# Patient Record
Sex: MALE | Race: WHITE | NOT HISPANIC OR LATINO | URBAN - METROPOLITAN AREA
[De-identification: names, ages, dates, MRNs, and addresses within clinical notes are randomized per-mention and may not be internally consistent; named-entity substitution may affect disease eponyms.]

---

## 2021-04-12 ENCOUNTER — NURSE TRIAGE (OUTPATIENT)
Dept: OTHER | Facility: OTHER | Age: 61
End: 2021-04-12

## 2021-04-12 DIAGNOSIS — Z20.828 SARS-ASSOCIATED CORONAVIRUS EXPOSURE: Primary | ICD-10-CM

## 2021-04-12 NOTE — TELEPHONE ENCOUNTER
1  Were you within 6 feet or less, for up to 15 minutes or more with a person that has a confirmed COVID-19 test? Yes was golfing Saturday  2  What was the date of your exposure? 2 Days ago  3  Are you experiencing any symptoms attributed to the virus?  (Assess for SOB, cough, fever, difficulty breathing) denies  4   HIGH RISK: Do you have any history heart or lung conditions, weakened immune system, diabetes, Asthma, CHF, HIV, COPD, Chemo, renal failure, sickle cell, etc? denies

## 2021-04-12 NOTE — TELEPHONE ENCOUNTER
Regarding: ASYMPTOMATIC - EXPOSURE - COVID TEST REQUEST  ----- Message from Yaneth Cagle sent at 4/12/2021  9:42 AM EDT -----  "I need to be tested due to my brother who tested positive for COVID and I was golfing with him on Saturday   No symptoms"

## 2021-04-23 ENCOUNTER — IMMUNIZATIONS (OUTPATIENT)
Dept: FAMILY MEDICINE CLINIC | Facility: HOSPITAL | Age: 61
End: 2021-04-23

## 2021-04-23 DIAGNOSIS — Z23 ENCOUNTER FOR IMMUNIZATION: Primary | ICD-10-CM

## 2021-04-23 PROCEDURE — 0011A SARS-COV-2 / COVID-19 MRNA VACCINE (MODERNA) 100 MCG: CPT

## 2021-04-23 PROCEDURE — 91301 SARS-COV-2 / COVID-19 MRNA VACCINE (MODERNA) 100 MCG: CPT

## 2021-05-21 ENCOUNTER — IMMUNIZATIONS (OUTPATIENT)
Dept: FAMILY MEDICINE CLINIC | Facility: HOSPITAL | Age: 61
End: 2021-05-21

## 2021-05-21 DIAGNOSIS — Z23 ENCOUNTER FOR IMMUNIZATION: Primary | ICD-10-CM

## 2021-05-21 PROCEDURE — 0012A SARS-COV-2 / COVID-19 MRNA VACCINE (MODERNA) 100 MCG: CPT

## 2021-05-21 PROCEDURE — 91301 SARS-COV-2 / COVID-19 MRNA VACCINE (MODERNA) 100 MCG: CPT

## 2022-10-05 ENCOUNTER — OFFICE VISIT (OUTPATIENT)
Dept: PSYCHIATRY | Facility: CLINIC | Age: 62
End: 2022-10-05
Payer: COMMERCIAL

## 2022-10-05 DIAGNOSIS — F41.1 GENERALIZED ANXIETY DISORDER: Primary | ICD-10-CM

## 2022-10-05 DIAGNOSIS — Z86.59 HISTORY OF BIPOLAR DISORDER: ICD-10-CM

## 2022-10-05 PROCEDURE — 99215 OFFICE O/P EST HI 40 MIN: CPT | Performed by: PSYCHIATRY & NEUROLOGY

## 2022-10-05 RX ORDER — LAMOTRIGINE 150 MG/1
150 TABLET ORAL DAILY
COMMUNITY

## 2022-10-05 RX ORDER — CLONAZEPAM 0.5 MG/1
0.5 TABLET ORAL 2 TIMES DAILY
COMMUNITY
End: 2022-10-05 | Stop reason: SDUPTHER

## 2022-10-05 RX ORDER — CLONAZEPAM 0.5 MG/1
TABLET ORAL
Qty: 75 TABLET | Refills: 1 | Status: SHIPPED | OUTPATIENT
Start: 2022-10-05

## 2022-10-05 NOTE — PATIENT INSTRUCTIONS
Thanks for presenting to today's appointment  Celestine based on what your reported there's findings of generalized anxiety  Has discussed recommend starting something for treatment   Think this through and plans to talk about on next psychiatric appointment at [place] [address]

## 2022-10-05 NOTE — PSYCH
[facility]/Hospital: [address]   [address] [facility]    Psychiatric Progress Note  MRN#: [id number]  Babitaflor Diamond 58 y o  male    This note was not shared with the patient due to reasonable likelihood of causing patient harm   This Patient was seen in the office today at [address]  Transfer case of Lopez Nail - due insurance requiring face to face appointment and not phone    Subjective:  Lencho Robison reported having anxiety due to work stressors  Works as  at job x 23 yrs  For last 2-3 yrs with anxiety- was once per wk  Now stressed, he's In the mist of looking for other jobs, he's put in applications  Also with limited funds due to [employer] his son his money  State otherwise, his family only talk to him if they want something  Now anxiety every other day x 3 wks  pace/restlessness  Sleep is fragment , waking up a couple of times than to go sleep  Never slept full 8 hrs  Also recently with  jittery, fidgets, tremors increased heart beat, irritable from day to day  Sometimes anxiety happens randomly  Also have thoughts of " nobody likes [him] or wants [him] but without SI  Thinks most of it is the stress at his job, but also financial worries  Typically more layed back, but recently not now  Now Clonzapem 0 5mg once in morning , day and at night   But not working like it did in past  Was diagnosed with Bipolar Disorder when he first was patient  Has moments of high energy, scored high on bipolar rating form  Then was prescribed lamotrigine for mood  Described as wonder drug       Medication List  Lamotrigine 150mg   Clonazepam 0 5mg 2 tablet + half a tablet     Psychiatric Hx:  Hospitalization: no  SIB/SA- no  Med Trail: no    Social Hx:  Living Situation: [place], [place]  Children: no, although has step son ( 27 yrs old  Martial Status:  x 22 yrs  Occupational:       Substance Hx:  ETOH- no, twice per yr, special occassions   Tobacco: half - 3/4 pack per day ( average  ~ 1/2 per day - 2 yrs)   Onset of smoking x 2 yrs  Illicit: sober x 80YJ- 30yrs ( smoked marijuana, crystal meth -snort, cocaine)  Quit cold turkey, due to lossing a lot around him and friends  Wiith cravings    Medical Hx: none    Allergies:   Codine- nausea  Flexeril- nausea        FHX:  Without mential health illness history   Mother - Type II diabetes ( insulin dependent) -  in  due to complication          Mental Status Evaluation:  General Appearance:  Makenzie Thompson is a 58 y o   male casually dressed, adequate hygiene and grooming, looks stated age   Behavior:  pleasant, cooperative, calm, adequate eye contact   Speech:  normal for rate, rhythm, volume, latency, amount   Mood:  anxious   Affect:  constricted and increased in range   Thought Process:  circumstantial, logical and tangential   Thought Content:  no overt delusions, normal, ruminations   Perceptual Disturbances: no auditory hallucinations, no visual hallucinations, does not appear responding to internal stimuli  Delusions  No   Risk Potential: Suicidal Ideations No  Homicidal Ideations No  Potential for Aggression No     Sensorium:  Oriented to person, place, time/date and situation   Memory:  recent and remote memory grossly intact   Consciousness:  alert and awake   Attention: attention span and concentration are age appropriate   Insight:  fair   Judgment: fair   Gait/Station: normal   Motor Activity: no abnormal movements     There were no vitals filed for this visit  Medications:   No current outpatient medications on file prior to visit  No current facility-administered medications on file prior to visit  Labs: I have personally reviewed all pertinent laboratory/tests results   Most Recent Labs: No results found ________________________________________________________________________    Lauren Mariano is 58 yr old  male, history bipolar disorder and anxiety  Presented with generalized anxiety associated with work stress , onset about 2 yrs  Symptoms of sleep disturbance , restlessness, irritability  Cased complicated by intermittent somatic symptoms not meeting criteria for panic attacks  Presented on Lamotrigine and Clonazepam  Completed TARI 7;discrepancies with narrative reporting's ,with MSE findings suggestive of higher severity     TARI 7= 5 - likely under reporting     DSM5  Generalized anxiety disorder  History of bipolar disorder      PLAN:   Discussed diagnotic impression and clinical finding  Recommended antianxiety medications, such as SSRI  Pt was resistant  Continued medications as prescribed   Lamotrigine 150mg  Clonazepam 0 5mg       Risks, benefits, and possible side effects of medications explained to patient and patient verbalizes understanding  Next Appointment:  4 wks      Encounter Duration: Time Spent 45 minutes with Patient  Greater than 50% of total time was spent with the patient             I interviewed and examined the patient  I discussed the clinical finding, diagnoses and treatment plan with the patient  I  wrote and reviewed the note , prescribed medications, and orders placed  I was present in the clinic and examined the patient      Mikie Macdonald DO ,10/05/22

## 2022-11-08 DIAGNOSIS — F31.60 BIPOLAR I DISORDER, MOST RECENT EPISODE MIXED (HCC): Primary | ICD-10-CM

## 2022-11-09 ENCOUNTER — OFFICE VISIT (OUTPATIENT)
Dept: PSYCHIATRY | Facility: CLINIC | Age: 62
End: 2022-11-09

## 2022-11-09 DIAGNOSIS — F31.60 BIPOLAR I DISORDER, MOST RECENT EPISODE MIXED (HCC): ICD-10-CM

## 2022-11-09 DIAGNOSIS — F41.1 GENERALIZED ANXIETY DISORDER: ICD-10-CM

## 2022-11-09 RX ORDER — CLONAZEPAM 0.5 MG/1
TABLET ORAL
Qty: 75 TABLET | Refills: 1 | Status: SHIPPED | OUTPATIENT
Start: 2022-11-09

## 2022-11-09 RX ORDER — LAMOTRIGINE 150 MG/1
TABLET ORAL
Qty: 90 TABLET | Refills: 1 | Status: SHIPPED | OUTPATIENT
Start: 2022-11-09 | End: 2022-11-09 | Stop reason: SDUPTHER

## 2022-11-09 RX ORDER — LAMOTRIGINE 150 MG/1
150 TABLET ORAL DAILY
Qty: 90 TABLET | Refills: 0 | Status: SHIPPED | OUTPATIENT
Start: 2022-11-09

## 2022-11-09 NOTE — PATIENT INSTRUCTIONS
Becky [place] [id number]   [facility]   Your medications were not changed

## 2022-11-09 NOTE — PSYCH
[facility]/Hospital: [address]   [address] [facility]    Psychiatric Progress Note  MRN#: [id number]  Kade Saucedo 58 y o  male    This note was not shared with the patient due to reasonable likelihood of causing patient harm   This Patient was seen in the office today at [facility] location  Subjective:  Celestine reported less anxiety , less panic attacks   Stated hes job is high stress ( [employer]) was able to talk to his boss and not  In positive mood  Denies SI , HI  Medication compliance: Lamotrigine 150mg - effective, also Clonazepam 0 5mg - sleep improvement , sometime hard to shut down thoughts then have hard time falling to sleep , but when he's a sleep , he's sleep  On Clonazepam every day  Celestine , Denies smoking marijuana, clean x 16 yrs         Mental Status Evaluation:  General Appearance:  Kade Saucedo is a 58 y o   male casually dressed, adequate hygiene and grooming, looks stated age   Behavior:  pleasant, cooperative, adequate eye contact   Speech:  normal for rate, rhythm, volume, latency, amount   Mood:  Less anxious    Affect:  Normal    Thought Process:  circumstantial, logical and tangential   Thought Content:  no overt delusions, normal, ruminations   Perceptual Disturbances:  does not appear responding or preoccupied   Delusions  No   Risk Potential: Suicidal Ideations No  Homicidal Ideations No  Potential for Aggression No     Sensorium:  Oriented to person, place, time/date and situation   Memory:  recent and remote memory grossly intact   Consciousness:  alert and awake   Attention: attention span and concentration are age appropriate   Insight:  fair   Judgment: fair   Gait/Station: normal   Motor Activity: no abnormal movements     There were no vitals filed for this visit       Medications:   Current Outpatient Medications on File Prior to Visit Medication Sig Dispense Refill   • clonazePAM (KlonoPIN) 0 5 mg tablet Clonazepam 0 5m tablet twice per day + half a tablet per day 75 tablet 1   • lamoTRIgine (LaMICtal) 150 MG tablet Take 150 mg by mouth daily       No current facility-administered medications on file prior to visit  Labs: I have personally reviewed all pertinent laboratory/tests results  Most Recent Labs: No results found   ________________________________________________________________________    A/P  Sharran Peabody is 58 yr old  male, history bipolar disorder and anxiety  Presented with generalized anxiety associated with work stress , onset about 2 yrs  Symptoms of sleep disturbance , restlessness, irritability  Cased complicated by intermittent somatic symptoms not meeting criteria for panic attacks  Presented on Lamotrigine and Clonazepam  Today , with stable mood , slight anxiety, Celestine reported as tolerable         DSM5  Generalized anxiety disorder  History of bipolar disorder      PLAN:   Discussed diagnotic impression and clinical finding   Continued medications as prescribed   Lamotrigine 150mg  Clonazepam 0 5mg  BID + PRN      Risks, benefits, and possible side effects of medications explained to patient and patient verbalizes understanding  Next Appointment:  3 months   Today's Appointment@ [facility]  Face To Face:  5:17 PM -5:31 PM;Documentation Time:  5:03 PM- 5:06 PM (11/10/22)  Encounter Duration: Time Spent 14 minutes with Patient  Greater than 50% of total time was spent with the patient

## 2023-01-25 ENCOUNTER — OFFICE VISIT (OUTPATIENT)
Dept: PSYCHIATRY | Facility: CLINIC | Age: 63
End: 2023-01-25

## 2023-01-25 DIAGNOSIS — F31.60 BIPOLAR I DISORDER, MOST RECENT EPISODE MIXED (HCC): ICD-10-CM

## 2023-01-25 DIAGNOSIS — F41.1 GENERALIZED ANXIETY DISORDER: ICD-10-CM

## 2023-01-25 RX ORDER — LAMOTRIGINE 150 MG/1
TABLET ORAL
Qty: 90 TABLET | Refills: 0 | Status: SHIPPED | OUTPATIENT
Start: 2023-01-25

## 2023-01-25 RX ORDER — CLONAZEPAM 0.5 MG/1
TABLET ORAL
Qty: 75 TABLET | Refills: 2 | Status: SHIPPED | OUTPATIENT
Start: 2023-01-25

## 2023-01-25 NOTE — PSYCH
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  Otherwise, mood is okay, less  Stress, he's time to occupy his time, with exercising, doing hands on tasks   Stable sleep, appetite stable  Anxiety-no, Si/HI-no    Medications: Celestine reported compliance to Lamotrigine 150mg in morning and Clonazepam- 1 in morning , 1/2 tablet in afternoon , and 1 at night   Medication side effect; none    ROS: none      Mental Status Evaluation:  General Appearance:  Mary Ellen Voss is a 58 y o   male casually dressed, adequate hygiene and grooming, looks stated age   Behavior:  pleasant, cooperative, adequate eye contact   Speech:  normal for rate, rhythm, volume, latency, amount   Mood:  Less anxious    Affect:  Normal    Thought Process:  circumstantial, logical and tangential   Thought Content:  no overt delusions, normal, ruminations   Perceptual Disturbances:  does not appear responding or preoccupied   Delusions  No   Risk Potential: Suicidal Ideations No  Homicidal Ideations No  Potential for Aggression No     Sensorium:  Oriented to person, place, time/date and situation   Memory:  recent and remote memory grossly intact   Consciousness:  alert and awake   Attention: attention span and concentration are age appropriate   Insight:  fair   Judgment: fair   Gait/Station: normal   Motor Activity: no abnormal movements     There were no vitals filed for this visit       Medications:   Current Outpatient Medications on File Prior to Visit   Medication Sig Dispense Refill   • clonazePAM (KlonoPIN) 0 5 mg tablet Clonazepam 0 5m tablet twice per day + half a tablet per day 75 tablet 1   • lamoTRIgine (LaMICtal) 150 MG tablet Take 1 tablet (150 mg total) by mouth daily 90 tablet 0     No current facility-administered medications on file prior to visit  Labs: I have personally reviewed all pertinent laboratory/tests results  No visits with results within 6 Month(s) from this visit  Latest known visit with results is:   No results found for any previous visit      ________________________________________________________________________    A/P  Griselda Harrell 58 y o ,  male, history bipolar disorder and anxiety  Presented with generalized anxiety associated with work stress , onset about 2 yrs  Cased complicated w/ associated somatic symptoms, and MSE finding of talkativeness, high energy and pitch  Today , presented with similar finding although stable mood despite acute stressors  DSM5  Generalized anxiety disorder  History of bipolar disorder      PLAN:   Discussed diagnotic impression  Based on history , external records reviewed and clinical finding   PDMP w/o discrepanies- 23  Lamotrigine 150mg  Clonazepam 0 5mg  BID +1/2 table     Medication Prescribed During This Encounter at [facility]:  •  clonazePAM (KlonoPIN) 0 5 mg tablet, Clonazepam 0 5m tablet twice daily  (morning ,night) + 1/2 tablet( in afternoon), Disp: 75 tablet, Rfl: 2  •  lamoTRIgine (LaMICtal) 150 MG tablet, Lamotrigine 150mg : 1 tablet po daily, Disp: 90 tablet, Rfl: 0      Treatement: Risks, benefits, and possible side effects of medications explained to patient and patient verbalizes understanding  Next Appointment:  3 months                       Today's Appointment@ [facility]                    Face To Face:  4:51 PM -5:06 PM;Documentation Time:  7:29 PM- 7:37 PM                     Encounter Duration: Time Spent 15 minutes with Patient  Greater than 50% of total time was spent with the patient     MDM  Number of Diagnoses or Management Options  Bipolar I disorder, most recent episode mixed ([facility] [address] ): established, improving  Generalized anxiety disorder: established, improving     Amount and/or Complexity of Data Reviewed  Review and summarize past medical records: yes    Risk of Complications, Morbidity, and/or Mortality  Presenting problems: low  Management options: low

## 2023-01-25 NOTE — PATIENT INSTRUCTIONS
Devaughn Jensen [id number]   Thanks for presenting to today's Appointment at [address]   Your medications were not changed

## 2023-04-19 DIAGNOSIS — F41.1 GENERALIZED ANXIETY DISORDER: ICD-10-CM

## 2023-04-19 DIAGNOSIS — F31.60 BIPOLAR I DISORDER, MOST RECENT EPISODE MIXED (HCC): ICD-10-CM

## 2023-04-19 RX ORDER — CLONAZEPAM 0.5 MG/1
TABLET ORAL
Qty: 75 TABLET | Refills: 2 | Status: SHIPPED | OUTPATIENT
Start: 2023-04-19 | End: 2023-08-02 | Stop reason: SDUPTHER

## 2023-04-19 RX ORDER — LAMOTRIGINE 150 MG/1
TABLET ORAL
Qty: 90 TABLET | Refills: 0 | Status: SHIPPED | OUTPATIENT
Start: 2023-04-19 | End: 2023-07-31 | Stop reason: SDUPTHER

## 2023-07-31 DIAGNOSIS — F31.60 BIPOLAR I DISORDER, MOST RECENT EPISODE MIXED (HCC): ICD-10-CM

## 2023-07-31 RX ORDER — LAMOTRIGINE 150 MG/1
TABLET ORAL
Qty: 5 TABLET | Refills: 1 | Status: SHIPPED | OUTPATIENT
Start: 2023-07-31 | End: 2023-08-02 | Stop reason: SDUPTHER

## 2023-07-31 NOTE — TELEPHONE ENCOUNTER
Pt Radha Rodriguez, 1960, was last seen at [facility] on 01/2023.  Sent short supply of Lamictal to pharmacy on file ([facility])     This note was not shared with the patient due to reasonable likelihood of causing patient harm

## 2023-07-31 NOTE — TELEPHONE ENCOUNTER
Pt requested refill of the lamotrigine 150    Said that he ran out this past Saturday and asked that he can have enough until his appt this Thursday    Was a Paul Job pt

## 2023-08-02 ENCOUNTER — OFFICE VISIT (OUTPATIENT)
Dept: PSYCHIATRY | Facility: CLINIC | Age: 63
End: 2023-08-02
Payer: COMMERCIAL

## 2023-08-02 DIAGNOSIS — F31.60 BIPOLAR I DISORDER, MOST RECENT EPISODE MIXED (HCC): ICD-10-CM

## 2023-08-02 DIAGNOSIS — F41.1 GENERALIZED ANXIETY DISORDER: ICD-10-CM

## 2023-08-02 PROCEDURE — 99214 OFFICE O/P EST MOD 30 MIN: CPT | Performed by: PSYCHIATRY & NEUROLOGY

## 2023-08-02 RX ORDER — METOPROLOL SUCCINATE 25 MG/1
25 TABLET, EXTENDED RELEASE ORAL EVERY 24 HOURS
COMMUNITY
Start: 2023-05-15

## 2023-08-02 RX ORDER — ATORVASTATIN CALCIUM 20 MG/1
20 TABLET, FILM COATED ORAL DAILY
COMMUNITY
Start: 2023-07-18

## 2023-08-02 RX ORDER — CLONAZEPAM 0.5 MG/1
TABLET ORAL
Qty: 75 TABLET | Refills: 2 | Status: SHIPPED | OUTPATIENT
Start: 2023-08-09 | End: 2023-08-30

## 2023-08-02 RX ORDER — LAMOTRIGINE 150 MG/1
TABLET ORAL
Qty: 90 TABLET | Refills: 0 | Status: SHIPPED | OUTPATIENT
Start: 2023-08-02

## 2023-08-02 NOTE — PATIENT INSTRUCTIONS
Emma Mcrae [id number]   Thanks for presenting to today's Appointment at [facility]  Your medications were not changed

## 2023-08-02 NOTE — PSYCH
[facility]/Hospital: [address] [facility]  [place], [address]    Psychiatric Progress Note  MRN#: [id number]  Radha Rodriguez 58 y.o. male    This note was not shared with the patient due to reasonable likelihood of causing patient harm   _________________________________________________________________________________________________________________________________  OFFICE APPOINTMENT   Patient was seen today at [address] [facility] location                                                Patient Radha Rodriguez ,1960   Prescriber/Physician: Ping Walker DO Physician Location:   [address] [facility] [address]     • This service was provided in the office. Patient is currently located in the [facility], where I am  licensed. • Patient gave consent to proceed with encounter; acknowledge understanding of security and privacy of encounter   • Patient verbalized understanding evaluation only involves Psychiatric diagnosing, prescribing, result monitoring   • Patient was informed this is a billable service  ___________________________________________________________________________________________________________________________________       Subjective:  [facility]. He took the day off from work , thought appointment was today, although was scheduled for tomorrow; he was happy he was able to get seen today. Otherwise, Abbie Lee dad  from kidney dz on  2023. There was some depression, although was able to grieve, he's now stable. Reported his social supports as his wife Paramjit Whelan) and his Rastafari. He complained of anxiety , he  recent had chest pain , had cardiac workup, and was diagnosed with aneurysm- currently stable. External Records Reviewed;  He presented to ER May 03,2023 for chest pain ,WNL labs and imagining. Also,  history of CT scan x 3 months ago - finding of thoracic  aortic aneurysm    ROS:  Si/Hi- denies  Sleep- stable; duration 6-8 hrs  Zeny- last occurred in 1yr ago, did not last long   Appetite- stable  Anxiety- stable    Medications: Celestine reported compliance to Lamotrigine 150mg in morning and Clonazepam- 1 in morning , 1/2 tablet in afternoon , and 1 at night . Medication side effect; denies    Medical ROS:    W/o  Chest pain, SOB, dizziness, radating pain   w/o headache  Defer to HPI for pertinant positive, otherwise negative     . Mental Status Evaluation:  General Appearance:  Hi Krause is a 58 y.o.  male casually dressed, adequate hygiene and grooming, looks stated age   Behavior:  pleasant, cooperative, adequate eye contact   Speech:  Normal to pressure for rate, rhythm, volume, latency, amount   Mood:  normla   Affect:  Normal    Thought Process:  circumstantial, logical and tangential   Thought Content:  no overt delusions, normal, ruminations   Perceptual Disturbances:  does not appear responding or preoccupied   Delusions  No   Risk Potential: Suicidal Ideations No  Homicidal Ideations No  Potential for Aggression No     Sensorium:  Oriented to person, place, time/date and situation   Memory:  recent and remote memory grossly intact   Consciousness:  alert and awake   Attention: attention span and concentration are age appropriate   Insight:  fair   Judgment: fair   Gait/Station: normal   Motor Activity: no abnormal movements     There were no vitals filed for this visit.      Medications:   Current Outpatient Medications on File Prior to Visit   Medication Sig Dispense Refill   • metoprolol succinate (TOPROL-XL) 25 mg 24 hr tablet Take 25 mg by mouth every 24 hours     • atorvastatin (LIPITOR) 20 mg tablet Take 20 mg by mouth daily     • [DISCONTINUED] clonazePAM (KlonoPIN) 0.5 mg tablet Clonazepam 0.5m tablet twice daily  (morning ,night) + 1/2 tablet( in afternoon) 75 tablet 2   • [DISCONTINUED] lamoTRIgine (LaMICtal) 150 MG tablet Lamotrigine 150mg : 1 tablet po daily 5 tablet 1     No current facility-administered medications on file prior to visit. Labs: I have personally reviewed all pertinent laboratory/tests results. No visits with results within 6 Month(s) from this visit. Latest known visit with results is:   No results found for any previous visit. External Record Reviewed:   cardio workup x 2 ( May 03, 2023, )   negative tropinonins x 2 , WNL CBC diff, CMP,   23: EKG , Normal sinus rhythm ,;       ________________________________________________________________________    A/P  Davonte Jones, is a  58 y.o.,  male, history bipolar disorder and anxiety, presented with generalized anxiety cause of work stressors. Interim events of noah symptoms and somatic complaints. Case complicated as he was recent diagnosed with thoracic aneurysm , currently stable       DSM5  Generalized anxiety disorder  History of bipolar disorder      PLAN:   History and external records were  Reviewed. Discussed clinical findings and diagnotic impression  Did not change patients medications   PDMP w/o discrepanies  Lamotrigine 150mg  Clonazepam 0.5mg  BID +1/2 table     Medication Prescribed During This Encounter at [facility]:  •  clonazePAM (KlonoPIN) 0.5 mg tablet, Clonazepam 0.5m tablet twice daily  (morning ,night) + 1/2 tablet( in afternoon), Disp: 75 tablet, Rfl: 2  •  lamoTRIgine (LaMICtal) 150 MG tablet, Lamotrigine 150mg : 1 tablet po daily, Disp: 90 tablet, Rfl: 0      Treatement: Risks, benefits, and possible side effects of medications explained to patient and patient verbalizes understanding.                       Next Appointment:  3 months                       Today's Appointment@ [facility]                    Face To Face:  3:09PM- 3: 29PM  ;Documentation Time:  7:00PM- 7:37PM                    Encounter Duration: Time Spent 20 minutes with Patient. Greater than 50% of total time was spent with the patient     MDM  Number of Diagnoses or Management Options  Bipolar I disorder, most recent episode mixed ([address]): established, improving  Generalized anxiety disorder: established, worsening     Amount and/or Complexity of Data Reviewed  Clinical lab tests: reviewed  Review and summarize past medical records: yes  Independent visualization of images, tracings, or specimens: yes    Risk of Complications, Morbidity, and/or Mortality  Presenting problems: low  Diagnostic procedures: moderate  Management options: moderate

## 2023-10-26 NOTE — PSYCH
[facility]: [address] [place] [address] Outpatient Clinic/Non Addiction   [facility], [address] [facility]    Psychiatric Progress Note  MRN#: [id number]  Nadine Gonzalez 61 y.o. male    This note was not shared with the patient due to reasonable likelihood of causing patient harm   _________________________________________________________________________________________________________________________________  OFFICE APPOINTMENT   Patient was seen today at [facility] location                                                Patient Nadine Gonzalez ,1960   Prescriber/Physician: Jaciel Tobar DO Physician Location:   [address] [place] [id number]     This service was provided in the office. Patient is currently located in the [place], where I am  licensed. Patient gave consent to proceed with encounter; acknowledge understanding of security and privacy of encounter   Patient verbalized understanding evaluation only involves Psychiatric diagnosing, prescribing, result monitoring   Patient was informed this is a billable service  ___________________________________________________________________________________________________________________________________     Chief Complaint   Patient presents with   • Anxiety        Subjective:. Nadine Gonzalez  stated  he was layed off his job- denied having associated distress. Anxiety was reported as stable, he last  felt anxious during August 2023 . Also denied bipolar symptoms, without high high or low low as he thinks Lamotrigine are working.  Stated Clonazepam- calms him, less shaking, less anxious  Otherwise has pending cardiology appointment for aneurysm    ROS:        Substance Abuse History- he's sober x 25 yrs   Si/Hi- denies  Sleep- well, duration 6-8 hrs   Depression- denies  Zeny- defer to above  Appetite- normal    Medications: Celestine reported compliance to Lamotrigine 150mg in morning and Clonazepam- 1 in morning , 1/2 tablet in afternoon , and 1 at night . Medication side effect; denies    Medical ROS:    Constitutional : negative fevers , chills  Cardiac- Negative chest pain , chest palpitation  Respiratory: Negative SOB, wheezing  Neuro: Negative falls , cognitive impairment  Defer to HPI for pertinant positive, otherwise negative     . Mental Status Evaluation:  General Appearance:  Walter Gregg is a 61 y.o.  male casually dressed,  looks stated age   Behavior:  pleasant,  adequate eye contact   Speech:  Normal to pressure for rate, rhythm, volume, latency, amount   Mood:  normal   Affect:  Normal    Thought Process:  normal and logical   Thought Content:  no overt delusions, normal   Perceptual Disturbances:  does not appear responding or preoccupied   Delusions  w/o   Risk Potential: Suicidal Ideations w/o  Homicidal Ideations w/o  Potential for Aggression  w/o   Sensorium:  Oriented to person, place [facility]), time/date ( 2023) and situation   Memory:  recent and remote memory grossly intact   Consciousness:  alert and awake   Attention: attention span and concentration are age appropriate   Insight:  appropriate   Judgment: appropriate   Gait/Station: normal   Motor Activity: no abnormal movements     There were no vitals filed for this visit.      Medications:   Current Outpatient Medications on File Prior to Visit   Medication Sig Dispense Refill   • atorvastatin (LIPITOR) 20 mg tablet Take 20 mg by mouth daily     • clonazePAM (KlonoPIN) 0.5 mg tablet Clonazepam 0.5m tablet twice daily  (morning ,night) + 1/2 tablet( in afternoon) Do not start before 2023. 75 tablet 2   • lamoTRIgine (LaMICtal) 150 MG tablet Lamotrigine 150mg : 1 tablet po daily 90 tablet 0   • metoprolol succinate (TOPROL-XL) 25 mg 24 hr tablet Take 25 mg by mouth every 24 hours       No current facility-administered medications on file prior to visit. Labs: I have personally reviewed all pertinent laboratory/tests results. No visits with results within 6 Month(s) from this visit. Latest known visit with results is:   No results found for any previous visit. External Record Reviewed:   cardio workup x 2 ( May 03, 2023, )   negative tropinonins x 2 , WNL CBC diff, CMP,   23: EKG , Normal sinus rhythm ,;       ________________________________________________________________________    A/P  Walter Gregg, is a  61 y.o.,  male, history bipolar disorder and anxiety, presented with generalized anxiety work related. Interim events pressured dialogue with anxiety. Case complicated , as there's  recent diagnoses of thoracic aneurysm pending surgery. Currently stable       DSM5  Generalized anxiety disorder  Bipolar disorder in full remission, most recent episode unspecified type            PLAN:   History and external records were reviewed. Discussed clinical findings and diagnotic impression; stable   Did not change patients medications   PDMP w/o discrepancies- Clonazepam 0.5mg refilled 23, has 1 refill       Medication Prescribed During This Encounter at [facility]:    -     clonazePAM (KlonoPIN) 0.5 mg tablet; Clonazepam 0.5m tablet twice daily  (morning ,night) + 1/2 tablet( in afternoon), qty 75 ;2 refills  -     lamoTRIgine (LaMICtal) 150 MG tablet; Lamotrigine 150mg : 1 tablet po daily, qty 90           Treatement: Risks, benefits, and possible side effects of medications explained to patient and patient verbalizes understanding. Next Appointment:  3 months                       Today's Appointment@ [facility]                    Face To Face:  5:39PM- 6:12PM                     Encounter Duration: Time Spent  33 minutes with Patient. Greater than 50% of total time was spent with the patient     MDM  Number of Diagnoses or Management Options  Diagnosis management comments: 2       Amount and/or Complexity of Data Reviewed  Review and summarize past medical records: yes    Risk of Complications, Morbidity, and/or Mortality  Presenting problems: low  Diagnostic procedures: moderate  Management options: moderate

## 2023-10-26 NOTE — PATIENT INSTRUCTIONS
Jorge Martinez [id number]   Thanks for presenting to today's Appointment at [address]  Your medications were not changed

## 2023-10-30 ENCOUNTER — OFFICE VISIT (OUTPATIENT)
Dept: PSYCHIATRY | Facility: CLINIC | Age: 63
End: 2023-10-30
Payer: COMMERCIAL

## 2023-10-30 DIAGNOSIS — F41.1 GENERALIZED ANXIETY DISORDER: Primary | ICD-10-CM

## 2023-10-30 DIAGNOSIS — F31.70 BIPOLAR DISORDER IN FULL REMISSION, MOST RECENT EPISODE UNSPECIFIED TYPE (HCC): ICD-10-CM

## 2023-10-30 PROCEDURE — 99213 OFFICE O/P EST LOW 20 MIN: CPT | Performed by: PSYCHIATRY & NEUROLOGY

## 2023-10-30 RX ORDER — CLONAZEPAM 0.5 MG/1
TABLET ORAL
Qty: 75 TABLET | Refills: 2 | Status: SHIPPED | OUTPATIENT
Start: 2023-10-30 | End: 2023-11-20

## 2023-10-30 RX ORDER — LAMOTRIGINE 150 MG/1
TABLET ORAL
Qty: 90 TABLET | Refills: 0 | Status: SHIPPED | OUTPATIENT
Start: 2023-10-30

## 2024-02-02 DIAGNOSIS — F31.70 BIPOLAR DISORDER IN FULL REMISSION, MOST RECENT EPISODE UNSPECIFIED TYPE (HCC): ICD-10-CM

## 2024-02-02 RX ORDER — LAMOTRIGINE 150 MG/1
TABLET ORAL
Qty: 90 TABLET | Refills: 0 | Status: SHIPPED | OUTPATIENT
Start: 2024-02-02 | End: 2024-02-09 | Stop reason: SDUPTHER

## 2024-02-02 NOTE — TELEPHONE ENCOUNTER
Pt Celestine Godfrey 1960, SLPF chart was reviewed. Lamotrigien 150mg was sent to [facility] pharmacy    This note was not shared with the patient due to reasonable likelihood of causing patient harm

## 2024-02-06 NOTE — PATIENT INSTRUCTIONS
Celestine Godfrey [id number]   Thanks for presenting to today's Appointment at [facility]  Lamotrigine was increased:  150mg tablet  in the morning ; 2 tablets of 25mg in  the afternoon  Your other  medications were not changed

## 2024-02-06 NOTE — PSYCH
[facility]/Hospital: [facility] Outpatient Clinic/Non Addiction   [address] [id number] [phone]    Psychiatric Progress Note  MRN#: [id number]  Celestine Godfrey 63 y.o. male    This note was not shared with the patient due to reasonable likelihood of causing patient harm   _________________________________________________________________________________________________________________________________  OFFICE APPOINTMENT   Patient was seen today at [facility] location                                                Patient Celestine Godfrey , male ,1960   Prescriber/Physician: Rafiq Mortensen DO, she/her Physician Location:   [facility] OUTPATIENT  [address] [facility] [address]     This service was provided in the office.  Patient is currently located in the [place], where I am  licensed.   Patient gave consent to proceed with encounter; acknowledge understanding of security and privacy of encounter   Patient verbalized understanding evaluation only involves Psychiatric diagnosing, prescribing, result monitoring   Patient was informed this is a billable service  ___________________________________________________________________________________________________________________________________     Reason For Visit   Chief Complaint   Patient presents with    Anxiety        Subjective:.    Celestine Godfrey complained of anxiety with associated  irritabile, shaking , and pacing cause he gets waned up about worries. Stated he tends to talk more also when anxious.  He described several stressors such as pending surgery , unemployment  and worries about finances . He has does landscaping jobs in warmer weather.  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  Medication side effect; denies    Medical ROS:    Respiratory: negative for Shortness of breath , hyperventilation  Cardiovascular: negative for chest pain, palpitations, and increase heart rate   Musculoskeletal:positive for Left carpal tunnel pain, he has pending surgery   Neurological: negative for denies recent falls    Pertinent items are noted in HPI, all other symptoms are negative       .    Mental Status Evaluation:  General Appearance:  Celestine Godfrey is a 63 y.o.  male casually dressed,  looks stated age   Behavior:  pleasant, restless, adequate eye contact   Speech:  Pressured,  WNL rhythm, volume, latency, amount   Mood:  Anxious    Affect:  Anxious and hyper    Thought Process:  disorganized, logical, and tangential   Thought Content:  no overt delusions, somatic preoccupation, ruminations   Perceptual Disturbances: Denies auditory and visual hallucinations when asked. Does not appear responding or preoccupied   Delusions  w/o   Risk Potential: Suicidal Ideations w/o  Homicidal Ideations w/o  Potential for Aggression  w/o   Sensorium:  Oriented to person, place ( [place], [place]), time/date ( February 9, 2024) and situation   Memory:  recent and remote memory grossly intact   Consciousness:  alert and awake   Attention: attention span and concentration are appropriate   Insight:  appropriate   Judgment: appropriate   Gait/Station: normal   Motor Activity: no abnormal movements     There were no vitals filed for this visit.     Medications:   Current Outpatient Medications on File Prior to Visit   Medication Sig Dispense Refill    atorvastatin (LIPITOR) 20 mg tablet Take 20 mg by mouth daily      clonazePAM (KlonoPIN) 0.5 mg tablet  Clonazepam 0.5m tablet twice daily  (morning ,night) + 1/2 tablet( in afternoon) 75 tablet 2    lamoTRIgine (LaMICtal) 150 MG tablet Lamotrigine 150mg : 1 tablet po daily 90 tablet 0    metoprolol succinate (TOPROL-XL) 25 mg 24 hr tablet Take 25 mg by mouth every 24 hours       No current facility-administered medications on file prior to visit.        Labs: I have personally reviewed all pertinent laboratory/tests results.   No visits with results within 6 Month(s) from this visit.   Latest known visit with results is:   No results found for any previous visit.       External Record Reviewed:   cardio workup x 2 ( May 03, 2023, )   negative tropinonins x 2 , WNL CBC diff, CMP,   23: EKG , Normal sinus rhythm ,;       ________________________________________________________________________    A/P  Celestine Godfrey, is a  63 y.o.,  male, recent diagnoses of thoracic aneurysm;  history bipolar disorder and anxiety, presented with generalized anxiety work related. Interim events pressured dialogue with anxiety. Currently with similar findings and moderate generalized anxiety  ( worries, irritability, restlessness and findings on concentration problems , as he was difficult to redirect.     DSM5  1. Generalized anxiety disorder    2. Bipolar affective disorder, remission status unspecified (HCC)                            PLAN:   History and external records were reviewed. Discussed clinical findings and diagnotic impression: TARI , rule out acute noah, plans to monitor   Discussed starting anxiety medications or increasing Lamotrigine, pt .Celestine Godfrey was resistant and later accepting of slight increase of Lamotrigine to alleviate agitation  Lamotrigine was increased to 200mg  PDMP w/o discrepancies- Clonazepam 0.5mg refilled 24  CRISIS plan was completed, pt Celestine Godfrey was informed to sign form      [facility]    -     lamoTRIgine  (LaMICtal) 150 MG tablet; Lamotrigine 150mg : 1 tablet po daily  -     lamoTRIgine (LaMICtal) 25 mg tablet; Lamotrigine 25m tablet po daily in the 12pm  -     clonazePAM (KlonoPIN) 0.5 mg tablet; Clonazepam 0.5m tablet twice daily  (morning ,night) + 1/2 tablet( in afternoon)          Treatement: Risks, benefits, and possible side effects of medications explained to patient and patient verbalizes understanding.                      [facility]  [facility]                      _____________________________________________________________________________________________________________________                    Patient Encounter: 1:59 - 2:28PM   Documentin:30PM- 2:45PM                    Encounter Duration: Time Spent  29 minutes with Patient.Greater than 50% of total time was spent with the patient     MDM  Number of Diagnoses or Management Options  Diagnosis management comments: 2       Amount and/or Complexity of Data Reviewed  Review and summarize past medical records: yes    Risk of Complications, Morbidity, and/or Mortality  Presenting problems: moderate  Diagnostic procedures: moderate  Management options: moderate      This note may have been written with the assistance of dictation software. Please excuse any grammatical  errors, misspellings,  and abnormal spacing of letters , sentences or paragraphs . For accurate interpretation should read note horizontally

## 2024-02-06 NOTE — BH CRISIS PLAN
[facility] Mental Health Outpatient    Client Name: Celestine Godfrey       Client YOB: 1960     CRISIS PLAN Creation Date: 02/09/24 and CRISIS PLAN Review Date : 1 yr -2/9/2025  ____________________________________________________________________________________________________________  [employer] SAFETY PLAN         Step 1: Warning Signs:   Celestine Godfrey you stated , not having suicide thought but if occurred:  A lot of stressor occurring at once   Anxiety   Pacing          Step 2: Internal Coping Strategies:    Celestine Godfrey you stated:  Reading the bible  Watching TV  Breathing Exercises  Stress Ball          Step 3: People and social settings that provide distraction:                    Names                                                                    Contacts   Danielle Godfrey ( Celestine's wife)                                   [phone]                                                                                                     Places:   Celestine anderson stated your room                                                                                              Step 4: People whom I can ask for help during a crisis:Ask: “Who can you contact to ask for help during a crisis and how would you contact them (include phone number or other way to contact them, e.g., text message)? Who can you share the safety plan with to help you during a crisis?”                              Name                                                                       Contact  Danielle Godfrey ( Rosario's wife)                                   [phone]  Fer Tucker  ( Celestine's friends)                        he has access to his number  Lukas Mcwilliams ( Celestine )                                don't talk to him as much                                                       Step 5: Professionals or agencies I can contact during a crisis:Ask: “What are the names of health care  professionals, agencies, hospitals or other organizations that you can contact during a crisis and how will you contact them (include phone number or other way to contact them)?”    Clinician/Agency Name:                                  Phone                                               Emergency Contact   [facility] CRISIS                           he able to get access to number     Celestine's wife ( she has numbers also)                Local Emergency Department:                              Emergency Dept Phone                  Emergency Dept Address    Patient was not able to identify contacts                                                                                             CRISIS PHONE NUMBERS   Suicide Prevention Lifeline: Call 5648-954-GLMD or Text  988 Crisis Text Line: Text HOME to 828-159   Please note: Some [facility] do not have a separate number for Child/Adolescent specific crisis. If your county is not listed under Child/Adolescent, please call the adult number for your county      Adult Crisis Numbers: Child/Adolescent Crisis Numbers   [facility]: [phone] [facility]: [phone]   [facility]: [phone] [facility]: [phone]   [facility]: [phone] [place]: [phone]   [facility]: [phone] [place]: [phone]   [facility]: [phone]   [facility]: [phone]   [facility]: [phone]   [facility]: [phone] (daytime) [phone] (after hours, weekends, holidays)      Step 6: Making the environment safer (plan for lethal means safety):Ask: “For each lethal method that is identified, what is the specific plan to reduce access to this lethal method so that time will pass, your suicide feelings will diminish and it will be less likely that you will actually kill yourself? Who may assist you with this plan to make your environment safer?”    Patient did not  identify any lethal methods              Optional:What is most important to me and worth living for?   Celestine Godfrey you stated: family, your wife, friends         [place]ia Safety Plan. Trista Rice and Siddhartha Singh ;  Use permission via  authors  ____________________________________________________________________________________________________________    This CRISIS plan was formulated via my Physician/ Psychiatrist and I . I,Celestine Godfrey patient ,   , understand and accept the CRISIS plan  developed for my treatment.

## 2024-02-09 ENCOUNTER — OFFICE VISIT (OUTPATIENT)
Dept: PSYCHIATRY | Facility: CLINIC | Age: 64
End: 2024-02-09
Payer: COMMERCIAL

## 2024-02-09 DIAGNOSIS — F31.9 BIPOLAR AFFECTIVE DISORDER, REMISSION STATUS UNSPECIFIED (HCC): ICD-10-CM

## 2024-02-09 DIAGNOSIS — F41.1 GENERALIZED ANXIETY DISORDER: Primary | ICD-10-CM

## 2024-02-09 PROCEDURE — 99214 OFFICE O/P EST MOD 30 MIN: CPT | Performed by: PSYCHIATRY & NEUROLOGY

## 2024-02-09 RX ORDER — LAMOTRIGINE 25 MG/1
TABLET ORAL
Qty: 60 TABLET | Refills: 1 | Status: SHIPPED | OUTPATIENT
Start: 2024-02-09

## 2024-02-09 RX ORDER — LAMOTRIGINE 150 MG/1
TABLET ORAL
Qty: 90 TABLET | Refills: 0 | Status: SHIPPED | OUTPATIENT
Start: 2024-02-09

## 2024-02-09 RX ORDER — CLONAZEPAM 0.5 MG/1
TABLET ORAL
Qty: 75 TABLET | Refills: 2 | Status: SHIPPED | OUTPATIENT
Start: 2024-02-09 | End: 2024-03-01

## 2024-03-22 NOTE — PATIENT INSTRUCTIONS
Celestine Godfrey [id number]   Thanks for presenting to today's Appointment at [facility]  Lamotrigine 25mg was stopped   Your other medications were not changed

## 2024-03-22 NOTE — PSYCH
"  [facility]/Hospital: [facility]Non Addiction   [address] [id number] [phone]    Psychiatric Progress Note  MRN#: [id number]  Celesitne Godfrey 63 y.o. male    This note was not shared with the patient due to reasonable likelihood of causing patient harm   _________________________________________________________________________________________________________________________________  OFFICE APPOINTMENT   Patient was seen today at [facility] location                                                Patient Celestine Godfrey , male ,1960   Prescriber/Physician: Rafiq Mortensen DO, she/her Physician Location:   [facility] OUTPATIENT  [address] [facility] [address]     This service was provided in the office.  Patient is currently located in the [place], where I am  licensed.   Patient gave consent to proceed with encounter; acknowledge understanding of security and privacy of encounter   Patient verbalized understanding evaluation only involves Psychiatric diagnosing, prescribing, result monitoring   Patient was informed this is a billable service  ___________________________________________________________________________________________________________________________________     Reason For Visit   Chief Complaint   Patient presents with    Anxiety        Subjective:.    Celestine Godfrey increased Lamotrigine to 200mg was tremulous and intense headache, so he lowered to 150mg . Otherwise stable now, without high high or low lows.  He returned to work 1 wk ago, without anxiety attacks or depression .    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  Medication side effect; denies    Medical ROS:    Cardiovascular: negative for chest pain, palpitations, and increase heart rate   Gastrointestinal: negative for constipation, diarrhea, nausea, and vomiting  Neurological: negative for headaches, tremors, and denies recent falls    Pertinent items are noted in HPI, all other symptoms are negative       .    Mental Status Evaluation:  General Appearance:  Celestine Godfrey is a 63 y.o.  male casually dressed,  looks stated age   Behavior:  pleasant, restless, adequate eye contact   Speech:  Pressured slightly ,  WNL rhythm, volume, latency, amount   Mood:  Normal   Affect:  Expansive    Thought Process:  normal and logical   Thought Content:  no overt delusions, normal, ruminations   Perceptual Disturbances: Denies auditory and visual hallucinations when asked. Does not appear responding or preoccupied   Delusions  w/o   Risk Potential: Suicidal Ideations w/o  Homicidal Ideations w/o  Potential for Aggression  w/o   Sensorium:  Oriented to person, place ( [employer]), time/date ( 2024) and situation   Memory:  recent and remote memory grossly intact   Consciousness:  alert and awake   Attention: attention span and concentration are appropriate   Insight:  appropriate   Judgment: appropriate   Gait/Station: normal   Motor Activity: no abnormal movements     There were no vitals filed for this visit.     Medications:   Current Outpatient Medications on File Prior to Visit   Medication Sig Dispense Refill    atorvastatin (LIPITOR) 20 mg tablet Take 20 mg by mouth daily      clonazePAM (KlonoPIN) 0.5 mg tablet Clonazepam 0.5m tablet twice daily  (morning ,night) +  1/2 tablet( in afternoon) 75 tablet 2    lamoTRIgine (LaMICtal) 150 MG tablet Lamotrigine 150mg : 1 tablet po daily 90 tablet 0    lamoTRIgine (LaMICtal) 25 mg tablet Lamotrigine 25m tablet po daily in the 12pm 60 tablet 1    metoprolol succinate (TOPROL-XL) 25 mg 24 hr tablet Take 25 mg by mouth every 24 hours       No current facility-administered medications on file prior to visit.        Labs: I have personally reviewed all pertinent laboratory/tests results.   No visits with results within 6 Month(s) from this visit.   Latest known visit with results is:   No results found for any previous visit.       External Record Reviewed:   cardio workup x 2 ( May 03, 2023, )   negative tropinonins x 2 , WNL CBC diff, CMP,   23: EKG , Normal sinus rhythm ,;       ________________________________________________________________________    A/P  Celestine Godfrey, is a  63 y.o.,  male, recent diagnoses of thoracic aneurysm;  history bipolar disorder and anxiety, presented with generalized anxiety work related. Interim events pressured dialogue with moderate generalized anxiety  ( worries, irritability, restlessness and findings on concentration problems , as he was difficult to redirect. Recently increased Lamotrigine to 200mg w/ adverse reactions, hence he lowered to 150mg .Otherwise with typical findings of high energy ,talkative pressured, although denial of bipolar noah    DSM5                        1. Bipolar affective disorder, remission status unspecified (HCC)    2. Generalized anxiety disorder         PLAN:   History and external records were reviewed. Discussed clinical findings and diagnotic impression: improving   Lamotrigine was lowered from 200mg to 150mg, s/e at higher dose   PDMP w/o discrepancies- Clonazepam 0.5mg refilled 24- 2 rf, although in EPIC as  ?  CRISIS plan was completed 2024,   Treatment plan was completed 2024, pt Celestine Godfrey signed  form      Medication Prescribed During This Encounter at [facility]:  -     clonazePAM (KlonoPIN) 0.5 mg tablet; Clonazepam 0.5m tablet twice daily  (morning ,night) + 1/2 tablet( in afternoon)                     Medications List Also:                               -     lamoTRIgine (LaMICtal) 150 MG tablet; Lamotrigine 150mg : 1 tablet po daily      Medications Discontinued During This Encounter   Medication Reason    lamoTRIgine (LaMICtal) 25 mg tablet      Treatement: Risks, benefits, and possible side effects of medications explained to patient and patient verbalizes understanding.                      Next [facility] Appointment:  3 months                      _____________________________________________________________________________________________________________________                    Patient Encounter:   5:19  - 5:36PM            Documentin:05PM-                     Encounter Duration: Time Spent  17 minutes with Patient.Greater than 50% of total time was spent with the patient     MDM  Number of Diagnoses or Management Options  Diagnosis management comments: 2       Amount and/or Complexity of Data Reviewed  Review and summarize past medical records: yes    Risk of Complications, Morbidity, and/or Mortality  Presenting problems: low  Diagnostic procedures: moderate  Management options: moderate      This note may have been written with the assistance of dictation software. Please excuse any grammatical  errors, misspellings,  and abnormal spacing of letters , sentences or paragraphs . For accurate interpretation should read note horizontally

## 2024-03-25 ENCOUNTER — OFFICE VISIT (OUTPATIENT)
Dept: PSYCHIATRY | Facility: CLINIC | Age: 64
End: 2024-03-25
Payer: COMMERCIAL

## 2024-03-25 DIAGNOSIS — F31.9 BIPOLAR AFFECTIVE DISORDER, REMISSION STATUS UNSPECIFIED (HCC): Primary | ICD-10-CM

## 2024-03-25 DIAGNOSIS — F41.1 GENERALIZED ANXIETY DISORDER: ICD-10-CM

## 2024-03-25 PROCEDURE — 99213 OFFICE O/P EST LOW 20 MIN: CPT | Performed by: PSYCHIATRY & NEUROLOGY

## 2024-03-25 RX ORDER — CLONAZEPAM 0.5 MG/1
TABLET ORAL
Qty: 75 TABLET | Refills: 2 | Status: SHIPPED | OUTPATIENT
Start: 2024-03-25 | End: 2024-04-15

## 2024-03-25 NOTE — BH TREATMENT PLAN
"TREATMENT PLAN  [facility]    Name and Date of Birth:  Celestine Godfrey 63 y.o. 1960, male   Date of Treatment Plan: March 25, 2024  Diagnosis/Diagnoses:    1. Bipolar affective disorder, remission status unspecified (HCC)    2. Generalized anxiety disorder        Strengths/Personal Resources for Self-Care: Celestine Godfrey has  supportive family, supportive friends, Mu-ism supports ,special hobby/interest ( golfing and bowling)  Area/Areas of need (in own words): He stated \" emotional stable, and remaining calm   1)  Long Term Goal:          maintain improvement in mood stability and anxiety   Reach Goal Date: 1 yr-2 yrs  Person/Persons responsible for completion of goal: Celestine Godfrey    2. Short Term Objective (s) - How to reach this goal?:           Recommended treatment:  Adherence to antianxiety and mood stabilizer due to diagnoses. Medication List: Lamotrigine , Clonazepam           Routine [facility] appointments to monitor medication response           Routine monitoring of labs/vitals if necessary           CRISIS intervention/Acute Hospitalization if necessary   Reach Goal Date: 6 months- 1yr  Person/Persons Responsible for Completion of Goal: Celestine Godfrey    Progress Towards Goals: improving gradually  Treatment Modality: [facility]  Review due 180 days from date of this plan: 6 months - 9/25/2024  Expected length of service: 1-3yrs unless revised      This treatment plan was formulated via my Physician/ Psychiatrist and I Everardo RODRIGUEZ Celestine Godfrey (patient) , understand the goals and objectives listed  and agree to work on goals and objectives listed.     This note may have been written with the assistance of dictation software. Please excuse any grammatical  errors, misspellings,  and abnormal spacing of letters , sentences or paragraphs . For accurate interpretation should read note horizontally   "

## 2024-06-21 ENCOUNTER — TELEPHONE (OUTPATIENT)
Dept: PSYCHIATRY | Facility: CLINIC | Age: 64
End: 2024-06-21

## 2024-06-21 NOTE — TELEPHONE ENCOUNTER
Called patient and rescheduled 6/24 appointment with Dr. Mortensen due to providers absence that day.

## 2024-06-26 ENCOUNTER — OFFICE VISIT (OUTPATIENT)
Dept: PSYCHIATRY | Facility: CLINIC | Age: 64
End: 2024-06-26
Payer: COMMERCIAL

## 2024-06-26 DIAGNOSIS — F31.9 BIPOLAR AFFECTIVE DISORDER, REMISSION STATUS UNSPECIFIED (HCC): ICD-10-CM

## 2024-06-26 DIAGNOSIS — F41.1 GENERALIZED ANXIETY DISORDER: ICD-10-CM

## 2024-06-26 PROCEDURE — 99214 OFFICE O/P EST MOD 30 MIN: CPT | Performed by: PSYCHIATRY & NEUROLOGY

## 2024-06-26 RX ORDER — CLONAZEPAM 0.5 MG/1
TABLET ORAL
Qty: 75 TABLET | Refills: 2 | Status: SHIPPED | OUTPATIENT
Start: 2024-06-26 | End: 2024-07-17

## 2024-06-26 RX ORDER — LAMOTRIGINE 150 MG/1
TABLET ORAL
Qty: 90 TABLET | Refills: 0 | Status: SHIPPED | OUTPATIENT
Start: 2024-06-26

## 2024-06-26 NOTE — PSYCH
"  [facility]/Hospital: [facility]   Mental Health Outpatient Clinic/Non Addiction   [address] [id number] [phone]    Psychiatric Progress Note  MRN#: [id number]  Celestine Godfrey 63 y.o. male    This note was not shared with the patient due to reasonable likelihood of causing patient harm   _________________________________________________________________________________________________________________________________  OFFICE APPOINTMENT   Patient was seen today at [facility] location                                                Patient Celestine Godfrey , male ,1960   Prescriber/Physician: Rafiq Mortensen DO, she/her Physician Location:   [facility] OUTPATIENT  [address] [facility] [address]     This service was provided in the office.  Patient is currently located in the [place], where I am  licensed.   Patient gave consent to proceed with encounter; acknowledge understanding of security and privacy of encounter   Patient verbalized understanding evaluation only involves Psychiatric diagnosing, prescribing, result monitoring   Patient was informed this is a billable service  ___________________________________________________________________________________________________________________________________     Reason For Visit   Chief Complaint   Patient presents with   • Anxiety        Subjective:.    Celestine Godfrey complained of slight anxiety, gradually has increased, as he has pending cardiac surgery in early July, for heart aneurysm; has grown to 5.3 cm    Finding of pressured dialogue, disorganized ,when questioned , Celestine Godfrey stated energy; has improved but don't think he's manic , as he's  able to sleep ; Explained his typical noah symptoms as : \"restless, running around cleaning things , function on 1 hr sleep and very [place]\".     ROS  Si/Hi- w/o                    Anger : denies   Sleep; normal duration   Depression- " w/o  Zney- w/o  Psychosis- denies         Medications: Celestine Godfrey is compliant to Lamotrigine 150mg in morning and Clonazepam- 1 in morning , 1/2 tablet in afternoon , and 1 at night .  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    Mental Status Evaluation:  General Appearance:  Celestine Godfrey is a 63 y.o.  male casually dressed,  looks stated age   Behavior:  pleasant, restless, intermittent to limited  eye gaze    Speech:  Pressured   WNL rhythm, volume, latency, amount   Mood:  Normal   Affect:  Elated    Thought Process:  logical and tangential   Thought Content:  no overt delusions, normal, slightly somatic preoccupied    Perceptual Disturbances: Denies auditory and visual hallucinations when asked. Does not appear responding or preoccupied   Delusions  w/o   Risk Potential: Suicidal Ideations w/o  Homicidal Ideations w/o  Potential for Aggression  w/o   Sensorium:  Oriented to person, place ( [employer] [facility]), time/date ( June 26, 2024) and situation   Memory:  recent and remote memory grossly intact   Consciousness:  alert and awake   Attention: attention span and concentration are appropriate   Insight:  appropriate   Judgment: appropriate   Gait/Station: normal   Motor Activity: no abnormal movements     There were no vitals filed for this visit.     Medications: Celestine Godfrey verified [facility]   Current Outpatient Medications on File Prior to Visit   Medication Sig Dispense Refill   • atorvastatin  (LIPITOR) 20 mg tablet Take 20 mg by mouth daily     • clonazePAM (KlonoPIN) 0.5 mg tablet Clonazepam 0.5m tablet twice daily  (morning ,night) + 1/2 tablet( in afternoon) 75 tablet 2   • lamoTRIgine (LaMICtal) 150 MG tablet Lamotrigine 150mg : 1 tablet po daily 90 tablet 0   • metoprolol succinate (TOPROL-XL) 25 mg 24 hr tablet Take 25 mg by mouth every 24 hours       No current facility-administered medications on file prior to visit.        Labs: I have personally reviewed all pertinent laboratory/tests results.   No visits with results within 6 Month(s) from this visit.   Latest known visit with results is:   No results found for any previous visit.       External Record Reviewed:   cardio workup x 2 ( May 03, 2023, )   negative tropinonins x 2 , WNL CBC diff, CMP,   23: EKG , Normal sinus rhythm ,;       ________________________________________________________________________    A/P  Celestine Godfrey, is a  63 y.o.,  male, recent diagnoses of thoracic aneurysm;  history bipolar disorder and anxiety, presented with generalized anxiety.  Case complicated as there persistent  pressured dialogue, high energy , difficult to redirect, otherwise appropriate insight and judgment likely due to Celestine Godfrey compliance to Lamotrigine and Clonazepam. Devoid of adverse reactions. Devoid of SI , plan or intent. Hence patient was not hospitalized       DSM5  1. Bipolar affective disorder, remission status unspecified (HCC)    2. Generalized anxiety disorder         PLAN:   History and external records were reviewed. Discussed clinical findings and diagnotic impression: unchanged , baseline  Did not change medications     PDMP w/o discrepancies- Clonazepam 0.5mg refilled  06/10/24 , qty 75   CRISIS plan was completed 2024,   Treatment plan was completed 2024, pt Celestine Godfrey signed form      Medication Prescribed During This Encounter at [facility]:  -     lamoTRIgine (LaMICtal) 150 MG  tablet; Lamotrigine 150mg : 1 tablet po daily  -     clonazePAM (KlonoPIN) 0.5 mg tablet; Clonazepam 0.5m tablet twice daily  (morning ,night) + 1/2 tablet( in afternoon)      Treatement: Risks, benefits, and possible side effects of medications explained to patient and patient verbalizes understanding.                      Next [facility] Appointment:  3 months                      ______________________________________________________________________________________________                    Patient Encounter: 5:32- 6:04PM                    Encounter Duration: Time Spent  32 minutes with Patient.Greater than 50% of total time was spent with the patient     MDM  Number of Diagnoses or Management Options  Diagnosis management comments: 2       Amount and/or Complexity of Data Reviewed  Review and summarize past medical records: yes    Risk of Complications, Morbidity, and/or Mortality  Presenting problems: moderate  Diagnostic procedures: moderate  Management options: low      This note may have been written with the assistance of dictation software. Please excuse any grammatical  errors, misspellings,  and abnormal spacing of letters , sentences or paragraphs . For accurate interpretation should read note horizontally

## 2024-06-26 NOTE — PATIENT INSTRUCTIONS
Celestine Godfrey [id number]   Thanks for presenting to your [facility] appointment   Your medications were not changed

## 2024-07-02 LAB
ALBUMIN SERPL-MCNC: 4.9 G/DL (ref 3.5–5)
ALP SERPL-CCNC: 101 U/L (ref 38–126)
ALT SERPL-CCNC: 33 U/L (ref 0–50)
APTT PPP: 29.8 SEC (ref 23.4–35)
AST SERPL-CCNC: 33 U/L (ref 17–59)
BUN SERPL-MCNC: 19 MG/DL (ref 9–20)
CALCIUM SERPL-MCNC: 10.5 MG/DL (ref 8.4–10.2)
CHLORIDE SERPL-SCNC: 104 MMOL/L (ref 98–107)
CO2 SERPL-SCNC: 30 MMOL/L (ref 22–30)
EGFR: > 60
ERYTHROCYTE [DISTWIDTH] IN BLOOD BY AUTOMATED COUNT: 12.7 % (ref 11.5–14.5)
ESTIMATED CREATININE CLEARANCE: 80 ML/MIN
GLUCOSE SERPL-MCNC: 91 MG/DL (ref 70–99)
HBA1C MFR BLD: 5.4 % (ref 4–5.6)
HCT VFR BLD AUTO: 43.7 % (ref 39–52)
HGB BLD-MCNC: 14.9 G/DL (ref 13–18)
INR PPP: 1.09
MCHC RBC AUTO-ENTMCNC: 34.1 G/DL (ref 33–37)
MCV RBC AUTO: 92.2 FL (ref 80–94)
NRBC BLD AUTO-RTO: 0 %
PLATELET # BLD AUTO: 224 10^3/UL (ref 130–400)
POTASSIUM SERPL-SCNC: 4.9 MMOL/L (ref 3.5–5.1)
PROT SERPL-MCNC: 7.6 G/DL (ref 6.3–8.2)
PROTHROMBIN TIME: 13.9 SEC (ref 11.4–14.6)
SODIUM SERPL-SCNC: 140 MMOL/L (ref 135–145)

## 2024-07-08 ENCOUNTER — HOSPITAL ENCOUNTER (INPATIENT)
Dept: HOSPITAL 99 - CVICU | Age: 64
LOS: 3 days | Discharge: HOME | DRG: 220 | End: 2024-07-11
Payer: COMMERCIAL

## 2024-07-08 VITALS — DIASTOLIC BLOOD PRESSURE: 50 MMHG | RESPIRATION RATE: 16 BRPM | SYSTOLIC BLOOD PRESSURE: 76 MMHG

## 2024-07-08 VITALS — RESPIRATION RATE: 14 BRPM

## 2024-07-08 VITALS — RESPIRATION RATE: 18 BRPM

## 2024-07-08 VITALS — RESPIRATION RATE: 22 BRPM

## 2024-07-08 VITALS — RESPIRATION RATE: 16 BRPM

## 2024-07-08 VITALS — RESPIRATION RATE: 23 BRPM

## 2024-07-08 VITALS — DIASTOLIC BLOOD PRESSURE: 61 MMHG | RESPIRATION RATE: 20 BRPM | SYSTOLIC BLOOD PRESSURE: 87 MMHG

## 2024-07-08 VITALS — RESPIRATION RATE: 20 BRPM

## 2024-07-08 VITALS — RESPIRATION RATE: 16 BRPM | SYSTOLIC BLOOD PRESSURE: 104 MMHG | DIASTOLIC BLOOD PRESSURE: 74 MMHG

## 2024-07-08 VITALS — RESPIRATION RATE: 19 BRPM

## 2024-07-08 VITALS — RESPIRATION RATE: 15 BRPM

## 2024-07-08 VITALS — DIASTOLIC BLOOD PRESSURE: 62 MMHG | RESPIRATION RATE: 13 BRPM | SYSTOLIC BLOOD PRESSURE: 90 MMHG

## 2024-07-08 VITALS — DIASTOLIC BLOOD PRESSURE: 76 MMHG | SYSTOLIC BLOOD PRESSURE: 114 MMHG | RESPIRATION RATE: 16 BRPM

## 2024-07-08 VITALS — RESPIRATION RATE: 21 BRPM

## 2024-07-08 VITALS — SYSTOLIC BLOOD PRESSURE: 137 MMHG | DIASTOLIC BLOOD PRESSURE: 97 MMHG

## 2024-07-08 VITALS — DIASTOLIC BLOOD PRESSURE: 61 MMHG | SYSTOLIC BLOOD PRESSURE: 107 MMHG | RESPIRATION RATE: 12 BRPM

## 2024-07-08 VITALS — BODY MASS INDEX: 32.3 KG/M2 | BODY MASS INDEX: 31.9 KG/M2 | BODY MASS INDEX: 31.1 KG/M2 | BODY MASS INDEX: 32.4 KG/M2

## 2024-07-08 VITALS — SYSTOLIC BLOOD PRESSURE: 90 MMHG | RESPIRATION RATE: 16 BRPM | DIASTOLIC BLOOD PRESSURE: 62 MMHG

## 2024-07-08 VITALS — SYSTOLIC BLOOD PRESSURE: 94 MMHG | DIASTOLIC BLOOD PRESSURE: 54 MMHG | RESPIRATION RATE: 31 BRPM

## 2024-07-08 VITALS — DIASTOLIC BLOOD PRESSURE: 75 MMHG | SYSTOLIC BLOOD PRESSURE: 110 MMHG | RESPIRATION RATE: 29 BRPM

## 2024-07-08 VITALS — DIASTOLIC BLOOD PRESSURE: 55 MMHG | SYSTOLIC BLOOD PRESSURE: 92 MMHG | RESPIRATION RATE: 20 BRPM

## 2024-07-08 VITALS — RESPIRATION RATE: 25 BRPM

## 2024-07-08 VITALS — RESPIRATION RATE: 21 BRPM | DIASTOLIC BLOOD PRESSURE: 65 MMHG | SYSTOLIC BLOOD PRESSURE: 101 MMHG

## 2024-07-08 VITALS — DIASTOLIC BLOOD PRESSURE: 76 MMHG | SYSTOLIC BLOOD PRESSURE: 97 MMHG | RESPIRATION RATE: 15 BRPM

## 2024-07-08 VITALS — SYSTOLIC BLOOD PRESSURE: 91 MMHG | RESPIRATION RATE: 16 BRPM | DIASTOLIC BLOOD PRESSURE: 61 MMHG

## 2024-07-08 VITALS — RESPIRATION RATE: 17 BRPM

## 2024-07-08 VITALS — RESPIRATION RATE: 27 BRPM

## 2024-07-08 VITALS — RESPIRATION RATE: 12 BRPM

## 2024-07-08 VITALS — DIASTOLIC BLOOD PRESSURE: 97 MMHG | RESPIRATION RATE: 18 BRPM | SYSTOLIC BLOOD PRESSURE: 137 MMHG

## 2024-07-08 VITALS — RESPIRATION RATE: 26 BRPM

## 2024-07-08 VITALS — SYSTOLIC BLOOD PRESSURE: 124 MMHG | DIASTOLIC BLOOD PRESSURE: 98 MMHG

## 2024-07-08 VITALS — RESPIRATION RATE: 22 BRPM | SYSTOLIC BLOOD PRESSURE: 79 MMHG

## 2024-07-08 VITALS — RESPIRATION RATE: 24 BRPM

## 2024-07-08 VITALS — RESPIRATION RATE: 10 BRPM

## 2024-07-08 VITALS — SYSTOLIC BLOOD PRESSURE: 95 MMHG | DIASTOLIC BLOOD PRESSURE: 71 MMHG | RESPIRATION RATE: 16 BRPM

## 2024-07-08 VITALS — RESPIRATION RATE: 13 BRPM

## 2024-07-08 VITALS — RESPIRATION RATE: 21 BRPM | SYSTOLIC BLOOD PRESSURE: 95 MMHG | DIASTOLIC BLOOD PRESSURE: 55 MMHG

## 2024-07-08 VITALS — DIASTOLIC BLOOD PRESSURE: 65 MMHG | RESPIRATION RATE: 16 BRPM | SYSTOLIC BLOOD PRESSURE: 106 MMHG

## 2024-07-08 VITALS — DIASTOLIC BLOOD PRESSURE: 98 MMHG | SYSTOLIC BLOOD PRESSURE: 124 MMHG

## 2024-07-08 DIAGNOSIS — I10: ICD-10-CM

## 2024-07-08 DIAGNOSIS — D62: ICD-10-CM

## 2024-07-08 DIAGNOSIS — J98.11: ICD-10-CM

## 2024-07-08 DIAGNOSIS — F41.9: ICD-10-CM

## 2024-07-08 DIAGNOSIS — F17.210: ICD-10-CM

## 2024-07-08 DIAGNOSIS — E66.09: ICD-10-CM

## 2024-07-08 DIAGNOSIS — I30.9: ICD-10-CM

## 2024-07-08 DIAGNOSIS — R00.1: ICD-10-CM

## 2024-07-08 DIAGNOSIS — Q23.1: ICD-10-CM

## 2024-07-08 DIAGNOSIS — E78.5: ICD-10-CM

## 2024-07-08 DIAGNOSIS — M54.50: ICD-10-CM

## 2024-07-08 DIAGNOSIS — Z79.899: ICD-10-CM

## 2024-07-08 DIAGNOSIS — F31.9: ICD-10-CM

## 2024-07-08 DIAGNOSIS — Z22.322: ICD-10-CM

## 2024-07-08 DIAGNOSIS — G89.29: ICD-10-CM

## 2024-07-08 DIAGNOSIS — I71.21: Primary | ICD-10-CM

## 2024-07-08 DIAGNOSIS — E86.1: ICD-10-CM

## 2024-07-08 DIAGNOSIS — E87.70: ICD-10-CM

## 2024-07-08 DIAGNOSIS — N20.0: ICD-10-CM

## 2024-07-08 LAB
ACT BLD: 476 SECONDS (ref 82–134)
ACT BLD: 486 SECONDS (ref 82–134)
ACT BLD: 540 SECONDS (ref 82–134)
ACT BLD: 608 SECONDS (ref 82–134)
ACT BLD: 93 SECONDS (ref 82–134)
ACT BLD: 96 SECONDS (ref 82–134)
APTT PPP: 30 SEC (ref 23.4–35)
B.E. - POC: -0.3 MMOL/L
B.E. - POC: -1.3 MMOL/L
B.E. - POC: 0.1 MMOL/L
B.E. - POC: 1.4 MMOL/L
B.E. - POC: 2.6 MMOL/L
BASE EXCESS BLDA CALC-SCNC: -1.6 MMOL/L
BASE EXCESS BLDA CALC-SCNC: -2 MMOL/L
BASE EXCESS BLDA CALC-SCNC: -2.4 MMOL/L
BUN SERPL-MCNC: 18 MG/DL (ref 9–20)
ESTIMATED CREATININE CLEARANCE: 87 ML/MIN
GAS FLOW.O2 O2 DELIVERY SYS: (no result) L/MIN
GLUCOSE - POC: 130 MG/DL (ref 65–99)
GLUCOSE - POC: 139 MG/DL (ref 65–99)
GLUCOSE - POC: 185 MG/DL (ref 65–99)
GLUCOSE - POC: 189 MG/DL (ref 65–99)
GLUCOSE - POC: 92 MG/DL (ref 65–99)
GLUCOSE - POINT OF CARE: 101 MG/DL (ref 70–99)
GLUCOSE - POINT OF CARE: 105 MG/DL (ref 70–99)
GLUCOSE - POINT OF CARE: 109 MG/DL (ref 70–99)
GLUCOSE - POINT OF CARE: 111 MG/DL (ref 70–99)
GLUCOSE - POINT OF CARE: 132 MG/DL (ref 70–99)
GLUCOSE - POINT OF CARE: 135 MG/DL (ref 70–99)
GLUCOSE - POINT OF CARE: 140 MG/DL (ref 70–99)
GLUCOSE - POINT OF CARE: 150 MG/DL (ref 70–99)
GLUCOSE - POINT OF CARE: 96 MG/DL (ref 70–99)
GLUCOSE SERPL-MCNC: 136 MG/DL (ref 70–99)
HCO3 - POC: 25 MMOL/L (ref 21–29)
HCO3 - POC: 25 MMOL/L (ref 21–29)
HCO3 - POC: 26 MMOL/L (ref 21–29)
HCO3 - POC: 27 MMOL/L (ref 21–29)
HCO3 - POC: 27 MMOL/L (ref 21–29)
HCO3 BLDA-SCNC: 23.7 MMOL/L (ref 21–28)
HCO3 BLDA-SCNC: 23.7 MMOL/L (ref 21–28)
HCO3 BLDA-SCNC: 25.6 MMOL/L (ref 21–28)
HCT VFR BLD AUTO: 40.2 % (ref 39–52)
HCT VFR BLD AUTO: 41.2 % (ref 39–52)
HEMATOCRIT - POC: 31 % PCV (ref 42–52)
HEMATOCRIT - POC: 34 % PCV (ref 42–52)
HEMATOCRIT - POC: 38 % PCV (ref 42–52)
HEMOGLOBIN CALCULATED - POC: 10.5
HEMOGLOBIN CALCULATED - POC: 11.4
HEMOGLOBIN CALCULATED - POC: 11.7
HEMOGLOBIN CALCULATED - POC: 11.7
HEMOGLOBIN CALCULATED - POC: 12.8
HGB BLD-MCNC: 13.6 G/DL (ref 13–18)
HGB BLD-MCNC: 14.1 G/DL (ref 13–18)
INR PPP: 1.38
IONIZED CALCIUM - POC: 1.14 MMOL/L (ref 1.12–1.27)
IONIZED CALCIUM - POC: 1.16 MMOL/L (ref 1.12–1.27)
IONIZED CALCIUM - POC: 1.18 MMOL/L (ref 1.12–1.27)
IONIZED CALCIUM - POC: 1.3 MMOL/L (ref 1.12–1.27)
IONIZED CALCIUM - POC: 1.42 MMOL/L (ref 1.12–1.27)
MAGNESIUM SERPL-MCNC: 2.5 MG/DL (ref 1.6–2.3)
O2 SATURATION %CALCULATED-POC: 100 5 (ref 92–96)
O2 SATURATION %CALCULATED-POC: 100 5 (ref 92–96)
O2 SATURATION %CALCULATED-POC: 99.8 5 (ref 92–96)
O2 SATURATION %CALCULATED-POC: 99.8 5 (ref 92–96)
O2 SATURATION %CALCULATED-POC: 99.9 5 (ref 92–96)
PCO2 - POC: 38 MMHG (ref 35–45)
PCO2 - POC: 39 MMHG (ref 35–45)
PCO2 - POC: 46 MMHG (ref 35–45)
PCO2 - POC: 46 MMHG (ref 35–45)
PCO2 - POC: 47 MMHG (ref 35–45)
PCO2 BLDA: 43 MMHG (ref 35–48)
PCO2 BLDA: 45 MMHG (ref 35–48)
PCO2 BLDA: 52 MMHG (ref 35–48)
PH - POC: 7.34 (ref 7.35–7.45)
PH - POC: 7.36 (ref 7.35–7.45)
PH - POC: 7.37 (ref 7.35–7.45)
PH - POC: 7.41 (ref 7.35–7.45)
PH - POC: 7.45 (ref 7.35–7.45)
PLATELET # BLD AUTO: 162 10^3/UL (ref 130–400)
PLATELET # BLD AUTO: 199 10^3/UL (ref 130–400)
PO2 - POC: 229 MMHG (ref 80–100)
PO2 - POC: 242 MMHG (ref 80–100)
PO2 - POC: 337 MMHG (ref 80–100)
PO2 - POC: 396 MMHG (ref 80–100)
PO2 - POC: 443 MMHG (ref 80–100)
PO2 BLDA: 113 MMHG (ref 83–108)
PO2 BLDA: 162 MMHG (ref 83–108)
PO2 BLDA: 94 MMHG (ref 83–108)
POTASSIUM - POC: 4 MMOL/L (ref 3.6–5)
POTASSIUM - POC: 4.3 MMOL/L (ref 3.6–5)
POTASSIUM - POC: 5.5 MMOL/L (ref 3.6–5)
POTASSIUM - POC: 5.6 MMOL/L (ref 3.6–5)
POTASSIUM - POC: 5.9 MMOL/L (ref 3.6–5)
POTASSIUM BLDA-SCNC: 3.8 MMOL/L (ref 3.5–5.1)
POTASSIUM BLDA-SCNC: 4.6 MMOL/L (ref 3.5–5.1)
PROTHROMBIN TIME: 17 SEC (ref 11.4–14.6)
SAO2 % BLDA: 97.6 % (ref 94–98)
SAO2 % BLDA: 99.3 % (ref 94–98)
SAO2 % BLDA: 99.6 % (ref 94–98)
SODIUM - POC: 138 MMOL/L (ref 135–145)
SODIUM - POC: 139 MMOL/L (ref 135–145)
SODIUM - POC: 141 MMOL/L (ref 135–145)
SODIUM - POC: 143 MMOL/L (ref 135–145)
SODIUM - POC: 143 MMOL/L (ref 135–145)
SODIUM BLDA-SCNC: 138 MMOL/L (ref 136–145)
SPECIMEN PROCESSING COMMENT: (no result)

## 2024-07-08 PROCEDURE — 88304 TISSUE EXAM BY PATHOLOGIST: CPT

## 2024-07-08 PROCEDURE — 5A1221Z PERFORMANCE OF CARDIAC OUTPUT, CONTINUOUS: ICD-10-PCS | Performed by: STUDENT IN AN ORGANIZED HEALTH CARE EDUCATION/TRAINING PROGRAM

## 2024-07-08 PROCEDURE — B24BZZ4 ULTRASONOGRAPHY OF HEART WITH AORTA, TRANSESOPHAGEAL: ICD-10-PCS | Performed by: ANESTHESIOLOGY

## 2024-07-08 PROCEDURE — 02QF0ZZ REPAIR AORTIC VALVE, OPEN APPROACH: ICD-10-PCS | Performed by: STUDENT IN AN ORGANIZED HEALTH CARE EDUCATION/TRAINING PROGRAM

## 2024-07-08 PROCEDURE — 02RX0JZ REPLACEMENT OF THORACIC AORTA, ASCENDING/ARCH WITH SYNTHETIC SUBSTITUTE, OPEN APPROACH: ICD-10-PCS | Performed by: STUDENT IN AN ORGANIZED HEALTH CARE EDUCATION/TRAINING PROGRAM

## 2024-07-08 PROCEDURE — P9045 ALBUMIN (HUMAN), 5%, 250 ML: HCPCS

## 2024-07-08 RX ADMIN — POTASSIUM CHLORIDE 50: 400 INJECTION, SOLUTION INTRAVENOUS at 12:41

## 2024-07-08 RX ADMIN — CEFAZOLIN 5: 10 INJECTION, POWDER, FOR SOLUTION INTRAVENOUS at 18:02

## 2024-07-08 RX ADMIN — GABAPENTIN: 100 CAPSULE ORAL at 11:35

## 2024-07-08 RX ADMIN — Medication 250: at 20:19

## 2024-07-08 RX ADMIN — ACETAMINOPHEN: 500 TABLET ORAL at 13:36

## 2024-07-08 RX ADMIN — GABAPENTIN 100 MG: 100 CAPSULE ORAL at 16:19

## 2024-07-08 RX ADMIN — Medication 500 MG: at 05:41

## 2024-07-08 RX ADMIN — GABAPENTIN 100 MG: 100 CAPSULE ORAL at 21:08

## 2024-07-08 RX ADMIN — VANCOMYCIN HYDROCHLORIDE 200: 1 INJECTION, SOLUTION INTRAVENOUS at 20:19

## 2024-07-08 RX ADMIN — AMIODARONE HYDROCHLORIDE: 200 TABLET ORAL at 15:34

## 2024-07-08 RX ADMIN — SENNOSIDES AND DOCUSATE SODIUM 1 TABLET: 8.6; 5 TABLET ORAL at 20:19

## 2024-07-08 RX ADMIN — OXYCODONE HYDROCHLORIDE 5 MG: 5 TABLET ORAL at 18:05

## 2024-07-08 RX ADMIN — CALCIUM CHLORIDE 60 MG: 100 INJECTION INTRAVENOUS; INTRAVENTRICULAR at 20:16

## 2024-07-08 RX ADMIN — ACETAMINOPHEN 1000 MG: 500 TABLET ORAL at 21:07

## 2024-07-08 RX ADMIN — SODIUM BICARBONATE 50 MEQ: 84 INJECTION INTRAVENOUS at 20:18

## 2024-07-08 RX ADMIN — POTASSIUM CHLORIDE 50: 400 INJECTION, SOLUTION INTRAVENOUS at 13:58

## 2024-07-08 RX ADMIN — MUPIROCIN 1 APPLIC: 20 OINTMENT TOPICAL at 20:18

## 2024-07-08 RX ADMIN — PANTOPRAZOLE SODIUM 40 MG: 40 TABLET, DELAYED RELEASE ORAL at 05:41

## 2024-07-08 RX ADMIN — MUPIROCIN 1 APPLIC: 20 OINTMENT TOPICAL at 05:39

## 2024-07-08 RX ADMIN — HYDROMORPHONE HYDROCHLORIDE 0.5 MG: 1 INJECTION, SOLUTION INTRAMUSCULAR; INTRAVENOUS; SUBCUTANEOUS at 12:48

## 2024-07-08 RX ADMIN — SODIUM CHLORIDE 500: 900 INJECTION, SOLUTION INTRAVENOUS at 12:43

## 2024-07-08 RX ADMIN — SODIUM CHLORIDE, SODIUM LACTATE, POTASSIUM CHLORIDE, AND CALCIUM CHLORIDE 250: 600; 310; 30; 20 INJECTION, SOLUTION INTRAVENOUS at 15:46

## 2024-07-08 RX ADMIN — ACETAMINOPHEN 100: 10 INJECTION INTRAVENOUS at 15:44

## 2024-07-08 RX ADMIN — METOPROLOL TARTRATE 25 MG: 25 TABLET, FILM COATED ORAL at 05:41

## 2024-07-08 RX ADMIN — ATORVASTATIN CALCIUM: 20 TABLET, FILM COATED ORAL at 11:35

## 2024-07-08 RX ADMIN — CEFAZOLIN 10: 10 INJECTION, POWDER, FOR SOLUTION INTRAVENOUS at 12:43

## 2024-07-09 VITALS — RESPIRATION RATE: 22 BRPM

## 2024-07-09 VITALS — RESPIRATION RATE: 21 BRPM

## 2024-07-09 VITALS — RESPIRATION RATE: 16 BRPM

## 2024-07-09 VITALS — RESPIRATION RATE: 19 BRPM

## 2024-07-09 VITALS — DIASTOLIC BLOOD PRESSURE: 53 MMHG | SYSTOLIC BLOOD PRESSURE: 90 MMHG

## 2024-07-09 VITALS — DIASTOLIC BLOOD PRESSURE: 75 MMHG | SYSTOLIC BLOOD PRESSURE: 102 MMHG

## 2024-07-09 VITALS — RESPIRATION RATE: 16 BRPM | SYSTOLIC BLOOD PRESSURE: 106 MMHG | DIASTOLIC BLOOD PRESSURE: 80 MMHG

## 2024-07-09 VITALS — DIASTOLIC BLOOD PRESSURE: 51 MMHG | SYSTOLIC BLOOD PRESSURE: 93 MMHG

## 2024-07-09 VITALS — RESPIRATION RATE: 18 BRPM

## 2024-07-09 VITALS — DIASTOLIC BLOOD PRESSURE: 68 MMHG | SYSTOLIC BLOOD PRESSURE: 98 MMHG | RESPIRATION RATE: 18 BRPM

## 2024-07-09 VITALS — DIASTOLIC BLOOD PRESSURE: 67 MMHG | SYSTOLIC BLOOD PRESSURE: 110 MMHG

## 2024-07-09 VITALS — DIASTOLIC BLOOD PRESSURE: 62 MMHG | SYSTOLIC BLOOD PRESSURE: 95 MMHG

## 2024-07-09 VITALS — RESPIRATION RATE: 13 BRPM

## 2024-07-09 VITALS — RESPIRATION RATE: 15 BRPM

## 2024-07-09 VITALS — RESPIRATION RATE: 17 BRPM

## 2024-07-09 VITALS — SYSTOLIC BLOOD PRESSURE: 110 MMHG | DIASTOLIC BLOOD PRESSURE: 67 MMHG

## 2024-07-09 VITALS — DIASTOLIC BLOOD PRESSURE: 54 MMHG | SYSTOLIC BLOOD PRESSURE: 95 MMHG

## 2024-07-09 VITALS — DIASTOLIC BLOOD PRESSURE: 61 MMHG | SYSTOLIC BLOOD PRESSURE: 94 MMHG | RESPIRATION RATE: 18 BRPM

## 2024-07-09 VITALS — DIASTOLIC BLOOD PRESSURE: 59 MMHG | SYSTOLIC BLOOD PRESSURE: 99 MMHG

## 2024-07-09 VITALS — RESPIRATION RATE: 23 BRPM

## 2024-07-09 VITALS — DIASTOLIC BLOOD PRESSURE: 60 MMHG | SYSTOLIC BLOOD PRESSURE: 93 MMHG

## 2024-07-09 VITALS — DIASTOLIC BLOOD PRESSURE: 61 MMHG | SYSTOLIC BLOOD PRESSURE: 97 MMHG

## 2024-07-09 VITALS — OXYGEN SATURATION: 96 % | SYSTOLIC BLOOD PRESSURE: 110 MMHG | HEART RATE: 65 BPM | DIASTOLIC BLOOD PRESSURE: 75 MMHG

## 2024-07-09 VITALS — DIASTOLIC BLOOD PRESSURE: 63 MMHG | SYSTOLIC BLOOD PRESSURE: 88 MMHG

## 2024-07-09 VITALS — DIASTOLIC BLOOD PRESSURE: 55 MMHG | SYSTOLIC BLOOD PRESSURE: 88 MMHG

## 2024-07-09 VITALS — RESPIRATION RATE: 14 BRPM

## 2024-07-09 VITALS — DIASTOLIC BLOOD PRESSURE: 72 MMHG | SYSTOLIC BLOOD PRESSURE: 145 MMHG

## 2024-07-09 VITALS — SYSTOLIC BLOOD PRESSURE: 113 MMHG | DIASTOLIC BLOOD PRESSURE: 72 MMHG | RESPIRATION RATE: 16 BRPM

## 2024-07-09 VITALS — DIASTOLIC BLOOD PRESSURE: 61 MMHG | SYSTOLIC BLOOD PRESSURE: 98 MMHG

## 2024-07-09 VITALS — DIASTOLIC BLOOD PRESSURE: 61 MMHG | SYSTOLIC BLOOD PRESSURE: 89 MMHG

## 2024-07-09 VITALS — SYSTOLIC BLOOD PRESSURE: 75 MMHG | DIASTOLIC BLOOD PRESSURE: 53 MMHG

## 2024-07-09 VITALS — SYSTOLIC BLOOD PRESSURE: 90 MMHG | DIASTOLIC BLOOD PRESSURE: 60 MMHG

## 2024-07-09 VITALS — SYSTOLIC BLOOD PRESSURE: 87 MMHG | DIASTOLIC BLOOD PRESSURE: 58 MMHG

## 2024-07-09 VITALS — SYSTOLIC BLOOD PRESSURE: 92 MMHG | DIASTOLIC BLOOD PRESSURE: 57 MMHG

## 2024-07-09 VITALS — RESPIRATION RATE: 27 BRPM | DIASTOLIC BLOOD PRESSURE: 69 MMHG | SYSTOLIC BLOOD PRESSURE: 96 MMHG

## 2024-07-09 VITALS — SYSTOLIC BLOOD PRESSURE: 163 MMHG | DIASTOLIC BLOOD PRESSURE: 145 MMHG

## 2024-07-09 VITALS — DIASTOLIC BLOOD PRESSURE: 58 MMHG | SYSTOLIC BLOOD PRESSURE: 90 MMHG

## 2024-07-09 VITALS — RESPIRATION RATE: 20 BRPM

## 2024-07-09 VITALS — SYSTOLIC BLOOD PRESSURE: 108 MMHG | DIASTOLIC BLOOD PRESSURE: 66 MMHG

## 2024-07-09 VITALS — DIASTOLIC BLOOD PRESSURE: 61 MMHG | SYSTOLIC BLOOD PRESSURE: 119 MMHG

## 2024-07-09 VITALS — DIASTOLIC BLOOD PRESSURE: 76 MMHG | SYSTOLIC BLOOD PRESSURE: 116 MMHG

## 2024-07-09 VITALS — SYSTOLIC BLOOD PRESSURE: 105 MMHG | DIASTOLIC BLOOD PRESSURE: 72 MMHG

## 2024-07-09 VITALS — RESPIRATION RATE: 28 BRPM

## 2024-07-09 LAB
BUN SERPL-MCNC: 28 MG/DL (ref 9–20)
CALCIUM SERPL-MCNC: 9.7 MG/DL (ref 8.4–10.2)
CHLORIDE SERPL-SCNC: 107 MMOL/L (ref 98–107)
CO2 SERPL-SCNC: 26 MMOL/L (ref 22–30)
EGFR: > 60
ERYTHROCYTE [DISTWIDTH] IN BLOOD BY AUTOMATED COUNT: 12.7 % (ref 11.5–14.5)
ESTIMATED CREATININE CLEARANCE: 87 ML/MIN
GLUCOSE - POINT OF CARE: 102 MG/DL (ref 70–99)
GLUCOSE - POINT OF CARE: 102 MG/DL (ref 70–99)
GLUCOSE - POINT OF CARE: 107 MG/DL (ref 70–99)
GLUCOSE - POINT OF CARE: 113 MG/DL (ref 70–99)
GLUCOSE - POINT OF CARE: 98 MG/DL (ref 70–99)
GLUCOSE SERPL-MCNC: 99 MG/DL (ref 70–99)
HCT VFR BLD AUTO: 38.4 % (ref 39–52)
HGB BLD-MCNC: 12.9 G/DL (ref 13–18)
MAGNESIUM SERPL-MCNC: 2.4 MG/DL (ref 1.6–2.3)
MCHC RBC AUTO-ENTMCNC: 33.6 G/DL (ref 33–37)
MCV RBC AUTO: 93.9 FL (ref 80–94)
PLATELET # BLD AUTO: 173 10^3/UL (ref 130–400)
POTASSIUM SERPL-SCNC: 4.3 MMOL/L (ref 3.5–5.1)
SODIUM SERPL-SCNC: 141 MMOL/L (ref 135–145)

## 2024-07-09 RX ADMIN — GABAPENTIN 100 MG: 100 CAPSULE ORAL at 08:32

## 2024-07-09 RX ADMIN — Medication 500 MG: at 08:30

## 2024-07-09 RX ADMIN — ACETAMINOPHEN 1000 MG: 500 TABLET ORAL at 14:07

## 2024-07-09 RX ADMIN — SENNOSIDES AND DOCUSATE SODIUM 1 TABLET: 8.6; 5 TABLET ORAL at 19:56

## 2024-07-09 RX ADMIN — ASPIRIN 81 MG: 81 TABLET, CHEWABLE ORAL at 08:30

## 2024-07-09 RX ADMIN — CEFAZOLIN 5: 10 INJECTION, POWDER, FOR SOLUTION INTRAVENOUS at 02:04

## 2024-07-09 RX ADMIN — SODIUM CHLORIDE: 900 INJECTION, SOLUTION INTRAVENOUS at 08:34

## 2024-07-09 RX ADMIN — ATORVASTATIN CALCIUM 20 MG: 20 TABLET, FILM COATED ORAL at 08:32

## 2024-07-09 RX ADMIN — PANTOPRAZOLE SODIUM 40 MG: 40 TABLET, DELAYED RELEASE ORAL at 08:30

## 2024-07-09 RX ADMIN — GABAPENTIN 100 MG: 100 CAPSULE ORAL at 16:22

## 2024-07-09 RX ADMIN — MUPIROCIN 1 APPLIC: 20 OINTMENT TOPICAL at 19:57

## 2024-07-09 RX ADMIN — SODIUM FERRIC GLUCONATE COMPLEX IN SUCROSE 110 MG: 12.5 INJECTION INTRAVENOUS at 14:07

## 2024-07-09 RX ADMIN — SODIUM CHLORIDE, SODIUM LACTATE, POTASSIUM CHLORIDE, AND CALCIUM CHLORIDE 500: 600; 310; 30; 20 INJECTION, SOLUTION INTRAVENOUS at 08:39

## 2024-07-09 RX ADMIN — VANCOMYCIN HYDROCHLORIDE 200: 1 INJECTION, SOLUTION INTRAVENOUS at 08:33

## 2024-07-09 RX ADMIN — ACETAMINOPHEN 1000 MG: 500 TABLET ORAL at 05:58

## 2024-07-09 RX ADMIN — GABAPENTIN 100 MG: 100 CAPSULE ORAL at 22:00

## 2024-07-09 RX ADMIN — MUPIROCIN 1 APPLIC: 20 OINTMENT TOPICAL at 08:33

## 2024-07-09 RX ADMIN — LAMOTRIGINE 150 MG: 100 TABLET ORAL at 08:32

## 2024-07-09 RX ADMIN — ACETAMINOPHEN 1000 MG: 500 TABLET ORAL at 19:56

## 2024-07-09 RX ADMIN — KETOROLAC TROMETHAMINE 15 MG: 15 INJECTION, SOLUTION INTRAMUSCULAR; INTRAVENOUS at 05:58

## 2024-07-09 RX ADMIN — SENNOSIDES AND DOCUSATE SODIUM 1 TABLET: 8.6; 5 TABLET ORAL at 08:32

## 2024-07-09 RX ADMIN — CEFAZOLIN 5: 10 INJECTION, POWDER, FOR SOLUTION INTRAVENOUS at 12:13

## 2024-07-10 VITALS — DIASTOLIC BLOOD PRESSURE: 64 MMHG | SYSTOLIC BLOOD PRESSURE: 116 MMHG

## 2024-07-10 VITALS — OXYGEN SATURATION: 98 % | SYSTOLIC BLOOD PRESSURE: 121 MMHG | HEART RATE: 76 BPM | DIASTOLIC BLOOD PRESSURE: 79 MMHG

## 2024-07-10 VITALS — DIASTOLIC BLOOD PRESSURE: 61 MMHG | SYSTOLIC BLOOD PRESSURE: 125 MMHG

## 2024-07-10 VITALS — DIASTOLIC BLOOD PRESSURE: 79 MMHG | SYSTOLIC BLOOD PRESSURE: 121 MMHG

## 2024-07-10 VITALS — SYSTOLIC BLOOD PRESSURE: 128 MMHG | DIASTOLIC BLOOD PRESSURE: 69 MMHG

## 2024-07-10 VITALS — DIASTOLIC BLOOD PRESSURE: 64 MMHG | SYSTOLIC BLOOD PRESSURE: 119 MMHG

## 2024-07-10 VITALS — DIASTOLIC BLOOD PRESSURE: 68 MMHG | SYSTOLIC BLOOD PRESSURE: 137 MMHG

## 2024-07-10 VITALS — DIASTOLIC BLOOD PRESSURE: 75 MMHG | SYSTOLIC BLOOD PRESSURE: 121 MMHG

## 2024-07-10 VITALS — SYSTOLIC BLOOD PRESSURE: 130 MMHG | DIASTOLIC BLOOD PRESSURE: 65 MMHG

## 2024-07-10 VITALS — DIASTOLIC BLOOD PRESSURE: 65 MMHG | SYSTOLIC BLOOD PRESSURE: 130 MMHG | RESPIRATION RATE: 18 BRPM

## 2024-07-10 VITALS — SYSTOLIC BLOOD PRESSURE: 118 MMHG | DIASTOLIC BLOOD PRESSURE: 68 MMHG

## 2024-07-10 VITALS — SYSTOLIC BLOOD PRESSURE: 122 MMHG | DIASTOLIC BLOOD PRESSURE: 69 MMHG

## 2024-07-10 VITALS — RESPIRATION RATE: 18 BRPM

## 2024-07-10 VITALS — RESPIRATION RATE: 16 BRPM

## 2024-07-10 VITALS — SYSTOLIC BLOOD PRESSURE: 131 MMHG | DIASTOLIC BLOOD PRESSURE: 64 MMHG

## 2024-07-10 LAB
BUN SERPL-MCNC: 39 MG/DL (ref 9–20)
CALCIUM SERPL-MCNC: 9.7 MG/DL (ref 8.4–10.2)
CHLORIDE SERPL-SCNC: 102 MMOL/L (ref 98–107)
CO2 SERPL-SCNC: 25 MMOL/L (ref 22–30)
EGFR: > 60
ERYTHROCYTE [DISTWIDTH] IN BLOOD BY AUTOMATED COUNT: 13 % (ref 11.5–14.5)
ESTIMATED CREATININE CLEARANCE: 78 ML/MIN
GLUCOSE SERPL-MCNC: 116 MG/DL (ref 70–99)
HCT VFR BLD AUTO: 34.4 % (ref 39–52)
HGB BLD-MCNC: 11.6 G/DL (ref 13–18)
MAGNESIUM SERPL-MCNC: 2.2 MG/DL (ref 1.6–2.3)
MCHC RBC AUTO-ENTMCNC: 33.7 G/DL (ref 33–37)
MCV RBC AUTO: 95.8 FL (ref 80–94)
PLATELET # BLD AUTO: 150 10^3/UL (ref 130–400)
POTASSIUM SERPL-SCNC: 4.1 MMOL/L (ref 3.5–5.1)
SODIUM SERPL-SCNC: 135 MMOL/L (ref 135–145)

## 2024-07-10 RX ADMIN — METOPROLOL SUCCINATE 12.5 MG: 25 TABLET, EXTENDED RELEASE ORAL at 08:20

## 2024-07-10 RX ADMIN — MUPIROCIN 1 APPLIC: 20 OINTMENT TOPICAL at 08:22

## 2024-07-10 RX ADMIN — ACETAMINOPHEN 1000 MG: 500 TABLET ORAL at 06:34

## 2024-07-10 RX ADMIN — Medication 500 MG: at 08:20

## 2024-07-10 RX ADMIN — SODIUM CHLORIDE 500: 900 INJECTION, SOLUTION INTRAVENOUS at 13:56

## 2024-07-10 RX ADMIN — MUPIROCIN 1 APPLIC: 20 OINTMENT TOPICAL at 20:02

## 2024-07-10 RX ADMIN — GABAPENTIN 100 MG: 100 CAPSULE ORAL at 22:15

## 2024-07-10 RX ADMIN — LAMOTRIGINE 150 MG: 100 TABLET ORAL at 08:19

## 2024-07-10 RX ADMIN — SENNOSIDES AND DOCUSATE SODIUM 1 TABLET: 8.6; 5 TABLET ORAL at 08:21

## 2024-07-10 RX ADMIN — GABAPENTIN 100 MG: 100 CAPSULE ORAL at 16:25

## 2024-07-10 RX ADMIN — ACETAMINOPHEN 1000 MG: 500 TABLET ORAL at 22:15

## 2024-07-10 RX ADMIN — SODIUM FERRIC GLUCONATE COMPLEX IN SUCROSE 110 MG: 12.5 INJECTION INTRAVENOUS at 13:56

## 2024-07-10 RX ADMIN — ACETAMINOPHEN 1000 MG: 500 TABLET ORAL at 13:56

## 2024-07-10 RX ADMIN — PANTOPRAZOLE SODIUM 40 MG: 40 TABLET, DELAYED RELEASE ORAL at 08:20

## 2024-07-10 RX ADMIN — AMIODARONE HYDROCHLORIDE 200 MG: 200 TABLET ORAL at 16:25

## 2024-07-10 RX ADMIN — AMIODARONE HYDROCHLORIDE 200 MG: 200 TABLET ORAL at 22:15

## 2024-07-10 RX ADMIN — ATORVASTATIN CALCIUM 20 MG: 20 TABLET, FILM COATED ORAL at 08:21

## 2024-07-10 RX ADMIN — GABAPENTIN 100 MG: 100 CAPSULE ORAL at 08:21

## 2024-07-10 RX ADMIN — SENNOSIDES AND DOCUSATE SODIUM 1 TABLET: 8.6; 5 TABLET ORAL at 20:02

## 2024-07-10 RX ADMIN — ASPIRIN 81 MG: 81 TABLET, CHEWABLE ORAL at 08:20

## 2024-07-11 VITALS — HEART RATE: 69 BPM | SYSTOLIC BLOOD PRESSURE: 103 MMHG | OXYGEN SATURATION: 95 % | DIASTOLIC BLOOD PRESSURE: 85 MMHG

## 2024-07-11 VITALS — SYSTOLIC BLOOD PRESSURE: 121 MMHG | DIASTOLIC BLOOD PRESSURE: 74 MMHG

## 2024-07-11 VITALS — DIASTOLIC BLOOD PRESSURE: 70 MMHG | SYSTOLIC BLOOD PRESSURE: 111 MMHG

## 2024-07-11 VITALS — DIASTOLIC BLOOD PRESSURE: 73 MMHG | SYSTOLIC BLOOD PRESSURE: 134 MMHG

## 2024-07-11 VITALS — SYSTOLIC BLOOD PRESSURE: 103 MMHG | DIASTOLIC BLOOD PRESSURE: 85 MMHG

## 2024-07-11 VITALS — RESPIRATION RATE: 17 BRPM

## 2024-07-11 VITALS — DIASTOLIC BLOOD PRESSURE: 58 MMHG | SYSTOLIC BLOOD PRESSURE: 118 MMHG

## 2024-07-11 LAB
BUN SERPL-MCNC: 24 MG/DL (ref 9–20)
CALCIUM SERPL-MCNC: 9.3 MG/DL (ref 8.4–10.2)
CHLORIDE SERPL-SCNC: 103 MMOL/L (ref 98–107)
CO2 SERPL-SCNC: 30 MMOL/L (ref 22–30)
EGFR: > 60
ERYTHROCYTE [DISTWIDTH] IN BLOOD BY AUTOMATED COUNT: 12.9 % (ref 11.5–14.5)
ESTIMATED CREATININE CLEARANCE: 87 ML/MIN
GLUCOSE SERPL-MCNC: 92 MG/DL (ref 70–99)
HCT VFR BLD AUTO: 31.9 % (ref 39–52)
HGB BLD-MCNC: 10.8 G/DL (ref 13–18)
MAGNESIUM SERPL-MCNC: 2.1 MG/DL (ref 1.6–2.3)
MCHC RBC AUTO-ENTMCNC: 33.9 G/DL (ref 33–37)
MCV RBC AUTO: 95.5 FL (ref 80–94)
PLATELET # BLD AUTO: 141 10^3/UL (ref 130–400)
POTASSIUM SERPL-SCNC: 4.1 MMOL/L (ref 3.5–5.1)
SODIUM SERPL-SCNC: 136 MMOL/L (ref 135–145)

## 2024-07-11 RX ADMIN — METOPROLOL SUCCINATE 12.5 MG: 25 TABLET, EXTENDED RELEASE ORAL at 08:43

## 2024-07-11 RX ADMIN — FUROSEMIDE 40 MG: 40 TABLET ORAL at 08:43

## 2024-07-11 RX ADMIN — LAMOTRIGINE 150 MG: 100 TABLET ORAL at 08:43

## 2024-07-11 RX ADMIN — AMIODARONE HYDROCHLORIDE 200 MG: 200 TABLET ORAL at 08:43

## 2024-07-11 RX ADMIN — ASPIRIN 81 MG: 81 TABLET, CHEWABLE ORAL at 08:44

## 2024-07-11 RX ADMIN — ACETAMINOPHEN 1000 MG: 500 TABLET ORAL at 06:19

## 2024-07-11 RX ADMIN — SENNOSIDES AND DOCUSATE SODIUM 1 TABLET: 8.6; 5 TABLET ORAL at 08:44

## 2024-07-11 RX ADMIN — SODIUM CHLORIDE: 900 INJECTION, SOLUTION INTRAVENOUS at 10:08

## 2024-07-11 RX ADMIN — MUPIROCIN 1 APPLIC: 20 OINTMENT TOPICAL at 08:42

## 2024-07-11 RX ADMIN — Medication 500 MG: at 08:44

## 2024-07-11 RX ADMIN — PANTOPRAZOLE SODIUM 40 MG: 40 TABLET, DELAYED RELEASE ORAL at 08:43

## 2024-07-11 RX ADMIN — ATORVASTATIN CALCIUM 20 MG: 20 TABLET, FILM COATED ORAL at 08:43

## 2024-07-11 RX ADMIN — GABAPENTIN 100 MG: 100 CAPSULE ORAL at 08:44

## 2024-07-19 ENCOUNTER — TELEPHONE (OUTPATIENT)
Dept: PSYCHIATRY | Facility: CLINIC | Age: 64
End: 2024-07-19

## 2024-07-19 NOTE — TELEPHONE ENCOUNTER
Writer called client to inform them that Dr. Mortensen has left the practice and cancelled scheduled appointment with Dr. Mortensen. Writer told client they can call the access center with any medication needs and let client know that intake will call them in regards to a transfer.

## 2024-09-23 ENCOUNTER — TELEPHONE (OUTPATIENT)
Dept: PSYCHIATRY | Facility: CLINIC | Age: 64
End: 2024-09-23

## 2024-09-25 ENCOUNTER — OFFICE VISIT (OUTPATIENT)
Dept: PSYCHIATRY | Facility: CLINIC | Age: 64
End: 2024-09-25
Payer: COMMERCIAL

## 2024-09-25 DIAGNOSIS — F41.1 GENERALIZED ANXIETY DISORDER: ICD-10-CM

## 2024-09-25 DIAGNOSIS — F31.9 BIPOLAR AFFECTIVE DISORDER, REMISSION STATUS UNSPECIFIED (HCC): ICD-10-CM

## 2024-09-25 PROCEDURE — 99214 OFFICE O/P EST MOD 30 MIN: CPT

## 2024-09-25 RX ORDER — CLONAZEPAM 0.5 MG/1
TABLET ORAL
Qty: 90 TABLET | Refills: 2 | Status: SHIPPED | OUTPATIENT
Start: 2024-09-25 | End: 2024-10-16

## 2024-09-25 RX ORDER — LAMOTRIGINE 150 MG/1
TABLET ORAL
Qty: 90 TABLET | Refills: 0 | Status: SHIPPED | OUTPATIENT
Start: 2024-09-25

## 2024-09-25 NOTE — PSYCH
[facility]/Hospital: [facility] Outpatient Clinic/Non Addiction   [address] [id number] [phone]    Psychiatric Progress Note  MRN#: [id number]  Celestine Godfrey 64 y.o. male    This note was not shared with the patient due to reasonable likelihood of causing patient harm   _________________________________________________________________________________________________________________________________  OFFICE APPOINTMENT   Patient was seen today at [facility] location                                                Patient Celestine Godfrey , male ,1960   Prescriber/Physician: VÍCTOR Martin, she/her Physician Location:   [facility] OUTPATIENT  [address] [facility] [address]     This service was provided in the office.  Patient is currently located in the [place], where I am  licensed.   Patient gave consent to proceed with encounter; acknowledge understanding of security and privacy of encounter   Patient verbalized understanding evaluation only involves Psychiatric diagnosing, prescribing, result monitoring   Patient was informed this is a billable service  ___________________________________________________________________________________________________________________________________     DSM5  1. Bipolar affective disorder, remission status unspecified (HCC)    2. Generalized anxiety disorder         PLAN:   Continue LamoTRIgine 150 mg 1 tablet po daily  Increase clonazePAM 0.5mg 1 tablet po tid prn       Subjective:.Former patient of Dr. Mortensen, is being treated for bipolar disorder and generalized anxiety disorder. He is observed on klonopin 0.5mg 1 tab in am, 1/2 tab in afternoon and 1 tab at HS and Lamicatal 150mg po daily. He tolerates the medications well, has been compliant and denies any side effects. Patient reports her cardiac surgery in June went well and heart aneurysm has resolved. Mood is stable. He  reports bilateral hand tremors have gotten worse since the surgery and tends to shake more when in social situations. Patient talks about worrying about finding a job and when his unemployment checks will stop. Patient reports his wife has been working more to pay the bills and wants to be able to contribute. Sleeping well. Appetite is adequate. Good energy and motivation.       ROS  Si/Hi- w/o                    Anger : denies   Sleep; normal duration   Depression- w/o  Zeny- w/o  Psychosis- denies         Medications: Celestine Godfrey is compliant to Lamotrigine 150mg in morning and Clonazepam- 1 in morning , 1/2 tablet in afternoon , and 1 at night .  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    Mental Status Evaluation:  General Appearance:  Celestine Godfrey is a 64 y.o.  male casually dressed,  looks stated age   Behavior:  pleasant, restless, intermittent to limited  eye gaze    Speech:  Pressured   WNL rhythm, volume, latency, amount   Mood:  Normal   Affect:  Elated    Thought Process:  logical and tangential   Thought Content:  no overt delusions, normal, slightly somatic preoccupied    Perceptual Disturbances: Denies auditory and visual hallucinations when asked. Does not appear responding or preoccupied   Delusions  w/o   Risk Potential: Suicidal Ideations w/o  Homicidal Ideations w/o  Potential for Aggression  w/o   Sensorium:  Oriented to person, place ( [facility]), time/date ( June 26, 2024) and situation   Memory:  recent and remote memory  grossly intact   Consciousness:  alert and awake   Attention: attention span and concentration are appropriate   Insight:  appropriate   Judgment: appropriate   Gait/Station: normal   Motor Activity: no abnormal movements     There were no vitals filed for this visit.     Medications: Celestine Godfrey verified medication list during recent [facility] appointment   Current Outpatient Medications on File Prior to Visit   Medication Sig Dispense Refill    atorvastatin (LIPITOR) 20 mg tablet Take 20 mg by mouth daily      clonazePAM (KlonoPIN) 0.5 mg tablet Clonazepam 0.5m tablet twice daily  (morning ,night) + 1/2 tablet( in afternoon) 75 tablet 2    lamoTRIgine (LaMICtal) 150 MG tablet Lamotrigine 150mg : 1 tablet po daily 90 tablet 0    metoprolol succinate (TOPROL-XL) 25 mg 24 hr tablet Take 25 mg by mouth every 24 hours       No current facility-administered medications on file prior to visit.        Labs: I have personally reviewed all pertinent laboratory/tests results.   No visits with results within 6 Month(s) from this visit.   Latest known visit with results is:   No results found for any previous visit.         ________________________________________________________________________    A/P  Celestine Godfrey, is a  64 y.o.,  male, recent diagnoses of thoracic aneurysm;  history bipolar disorder and anxiety, presented with generalized anxiety.  Case complicated as there persistent  pressured dialogue, high energy , difficult to redirect, otherwise appropriate insight and judgment likely due to Celestine Godfrey compliance to Lamotrigine and Clonazepam. Devoid of adverse reactions. Devoid of SI , plan or intent. Hence patient was not hospitalized             Treatement: Risks, benefits, and possible side effects of medications explained to patient and patient verbalizes understanding.                      Next [facility] Appointment:  3 months                       ______________________________________________________________________________________________                    Patient Encounter: 3:30- 4:00PM                    Encounter Duration: Time Spent  30 minutes with Patient.Greater than 50% of total time was spent with the patient

## 2024-12-23 ENCOUNTER — OFFICE VISIT (OUTPATIENT)
Dept: PSYCHIATRY | Facility: CLINIC | Age: 64
End: 2024-12-23
Payer: COMMERCIAL

## 2024-12-23 DIAGNOSIS — F31.9 BIPOLAR AFFECTIVE DISORDER, REMISSION STATUS UNSPECIFIED (HCC): ICD-10-CM

## 2024-12-23 DIAGNOSIS — F41.1 GENERALIZED ANXIETY DISORDER: ICD-10-CM

## 2024-12-23 PROCEDURE — 99214 OFFICE O/P EST MOD 30 MIN: CPT

## 2024-12-23 RX ORDER — LAMOTRIGINE 150 MG/1
TABLET ORAL
Qty: 90 TABLET | Refills: 0 | Status: SHIPPED | OUTPATIENT
Start: 2024-12-23

## 2024-12-23 RX ORDER — CLONAZEPAM 0.5 MG/1
TABLET ORAL
Qty: 90 TABLET | Refills: 2 | Status: SHIPPED | OUTPATIENT
Start: 2024-12-23 | End: 2025-01-13

## 2024-12-23 NOTE — PSYCH
"  [facility]/Hospital: [facility] [place] Out[facility]/Non Addiction   [address] [id number] [phone]    Psychiatric Progress Note  MRN#: [id number]  Celestine Godfrey 64 y.o. male    This note was not shared with the patient due to reasonable likelihood of causing patient harm   _________________________________________________________________________________________________________________________________  OFFICE APPOINTMENT   Patient was seen today at [facility] location                                                Patient Celetsine Godfrey , male ,1960   Prescriber/Physician: VÍCTOR Martin, she/her Physician Location:   [facility] OUTPATIENT  [address] [facility] [address]     This service was provided in the office.  Patient is currently located in the [place], where I am  licensed.   Patient gave consent to proceed with encounter; acknowledge understanding of security and privacy of encounter   Patient verbalized understanding evaluation only involves Psychiatric diagnosing, prescribing, result monitoring   Patient was informed this is a billable service  ___________________________________________________________________________________________________________________________________     DSM5  1. Bipolar affective disorder, remission status unspecified (HCC)    2. Generalized anxiety disorder         PLAN:   Continue LamoTRIgine 150 mg 1 tablet po daily  Continue clonazePAM 0.5mg 1 tablet po tid prn       Subjective:.  Patient is being treated for bipolar disorder and generalized anxiety disorder. He is observed on klonopin 0.5mg po tid prn and Lamicatal 150mg po daily. He tolerates the medications well, has been compliant and denies any side effects. He states \"I'm doing really well.\" Patient admits difficulty finding a job and believes its due to his age. Patient seems hopeful with [employer] and is waiting to hear " "back from them. Mood has been stable. He denies depression, \"I haven't been depressed in awhile.\" Anxiety has been \"minimal.\" Sleep and appetite is adequate. Good energy and motivation. HE denies si/hi.         ROS  Si/Hi- w/o                    Anger : denies   Sleep; normal duration   Depression- w/o  Zeny- w/o  Psychosis- denies         Medications: Celestine Godfrey is compliant to Lamotrigine 150mg in morning and Clonazepam- 1 in morning , 1/2 tablet in afternoon , and 1 at night .  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    Mental Status Evaluation:  General Appearance:  Celestine Godfrey is a 64 y.o.  male casually dressed,  looks stated age   Behavior:  pleasant, restless, intermittent to limited  eye gaze    Speech:  Pressured   WNL rhythm, volume, latency, amount   Mood:  Normal   Affect:  Elated    Thought Process:  logical and tangential   Thought Content:  no overt delusions, normal, slightly somatic preoccupied    Perceptual Disturbances: Denies auditory and visual hallucinations when asked. Does not appear responding or preoccupied   Delusions  w/o   Risk Potential: Suicidal Ideations w/o  Homicidal Ideations w/o  Potential for Aggression  w/o   Sensorium:  Oriented to person, place ( [facility]), time/date ( June 26, 2024) and situation   Memory:  recent and remote memory grossly intact   Consciousness:  alert and awake   Attention: attention span and concentration are appropriate   Insight:  appropriate   Judgment: appropriate "   Gait/Station: normal   Motor Activity: no abnormal movements     There were no vitals filed for this visit.     Medications: Celestine Godfrey [facility]   Current Outpatient Medications on File Prior to Visit   Medication Sig Dispense Refill    atorvastatin (LIPITOR) 20 mg tablet Take 20 mg by mouth daily      clonazePAM (KlonoPIN) 0.5 mg tablet Clonazepam 0.5m tablet 3x a day 90 tablet 2    lamoTRIgine (LaMICtal) 150 MG tablet Lamotrigine 150mg : 1 tablet po daily 90 tablet 0    metoprolol succinate (TOPROL-XL) 25 mg 24 hr tablet Take 25 mg by mouth every 24 hours       No current facility-administered medications on file prior to visit.        Labs: I have personally reviewed all pertinent laboratory/tests results.   No visits with results within 6 Month(s) from this visit.   Latest known visit with results is:   No results found for any previous visit.         ________________________________________________________________________    A/P  Celestine Godfrey, is a  64 y.o.,  male, recent diagnoses of thoracic aneurysm;  history bipolar disorder and anxiety, presented with generalized anxiety.  Case complicated as there persistent  pressured dialogue, high energy , difficult to redirect, otherwise appropriate insight and judgment likely due to Celestine Godfrey compliance to Lamotrigine and Clonazepam. Devoid of adverse reactions. Devoid of SI , plan or intent. Hence patient was not hospitalized             Treatement: Risks, benefits, and possible side effects of medications explained to patient and patient verbalizes understanding.                      [facility]  [facility]                      ______________________________________________________________________________________________                    Patient Encounter: 6:30 PM - 6:55 PM                    Encounter Duration: Time Spent  25 minutes with Patient.Greater than 50% of total time was spent  with the patient

## 2025-03-17 ENCOUNTER — OFFICE VISIT (OUTPATIENT)
Dept: PSYCHIATRY | Facility: CLINIC | Age: 65
End: 2025-03-17
Payer: COMMERCIAL

## 2025-03-17 DIAGNOSIS — F41.1 GENERALIZED ANXIETY DISORDER: ICD-10-CM

## 2025-03-17 DIAGNOSIS — F31.9 BIPOLAR AFFECTIVE DISORDER, REMISSION STATUS UNSPECIFIED (HCC): ICD-10-CM

## 2025-03-17 PROCEDURE — 99214 OFFICE O/P EST MOD 30 MIN: CPT

## 2025-03-17 RX ORDER — LAMOTRIGINE 150 MG/1
TABLET ORAL
Qty: 90 TABLET | Refills: 0 | Status: SHIPPED | OUTPATIENT
Start: 2025-03-17

## 2025-03-17 RX ORDER — CLONAZEPAM 0.5 MG/1
TABLET ORAL
Qty: 90 TABLET | Refills: 2 | Status: SHIPPED | OUTPATIENT
Start: 2025-03-17 | End: 2025-04-07

## 2025-03-17 NOTE — PSYCH
[facility]/Hospital: [facility] Out[facility] Clinic/Non Addiction   [address] [id number] [phone]    Psychiatric Progress Note  MRN#: [id number]  Celestine Godfrey 64 y.o. male    This note was not shared with the patient due to reasonable likelihood of causing patient harm   _________________________________________________________________________________________________________________________________  OFFICE APPOINTMENT   Patient was seen today at [facility] location                                                Patient Celestine Godfrey , male ,1960   Prescriber/Physician: VÍCTOR Martin, she/her Physician Location:   [facility] OUTPATIENT  [address] [facility] [address]     This service was provided in the office.  Patient is currently located in the [place], where I am  licensed.   Patient gave consent to proceed with encounter; acknowledge understanding of security and privacy of encounter   Patient verbalized understanding evaluation only involves Psychiatric diagnosing, prescribing, result monitoring   Patient was informed this is a billable service  ___________________________________________________________________________________________________________________________________     DSM5  1. Bipolar affective disorder, remission status unspecified (HCC)    2. Generalized anxiety disorder         PLAN:   Continue LamoTRIgine 150 mg 1 tablet po daily  Continue clonazePAM 0.5mg 1 tablet po tid prn       Subjective:.  Patient is being treated for bipolar disorder and generalized anxiety disorder. He is observed on klonopin 0.5mg po tid prn and Lamicatal 150mg po daily. He tolerates the medications well, has been compliant and denies any side effects. Mood has been stable with minimal depression and anxiety. Continues to look for a job but doesn't think he will be hired due to age. Sleep and appetite is adequate.  Good energy and motivation. He denies si/hi.         ROS  Si/Hi- w/o                    Anger : denies   Sleep; normal duration   Depression- w/o  Zeny- w/o  Psychosis- denies         Medications: Celestine Godfrey is compliant to Lamotrigine 150mg in morning and Clonazepam- 1 in morning , 1/2 tablet in afternoon , and 1 at night .  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    Mental Status Evaluation:  General Appearance:  Celestine Godfrey is a 64 y.o.  male casually dressed,  looks stated age   Behavior:  pleasant, restless, intermittent to limited  eye gaze    Speech:  Pressured   WNL rhythm, volume, latency, amount   Mood:  Normal   Affect:  Elated    Thought Process:  logical and tangential   Thought Content:  no overt delusions, normal, slightly somatic preoccupied    Perceptual Disturbances: Denies auditory and visual hallucinations when asked. Does not appear responding or preoccupied   Delusions  w/o   Risk Potential: Suicidal Ideations w/o  Homicidal Ideations w/o  Potential for Aggression  w/o   Sensorium:  Oriented to person, place ( [facility]), time/date ( June 26, 2024) and situation   Memory:  recent and remote memory grossly intact   Consciousness:  alert and awake   Attention: attention span and concentration are appropriate   Insight:  appropriate   Judgment: appropriate   Gait/Station: normal   Motor Activity: no abnormal movements     There were no vitals filed for this visit.     Medications: Celestine Godfrey verified medication list  during recent [facility] appointment   Current Outpatient Medications on File Prior to Visit   Medication Sig Dispense Refill    atorvastatin (LIPITOR) 20 mg tablet Take 20 mg by mouth daily      clonazePAM (KlonoPIN) 0.5 mg tablet 1 tablet po tid prn 90 tablet 2    lamoTRIgine (LaMICtal) 150 MG tablet Lamotrigine 150mg : 1 tablet po daily 90 tablet 0    metoprolol succinate (TOPROL-XL) 25 mg 24 hr tablet Take 25 mg by mouth every 24 hours       No current facility-administered medications on file prior to visit.        Labs: I have personally reviewed all pertinent laboratory/tests results.   No visits with results within 6 Month(s) from this visit.   Latest known visit with results is:   No results found for any previous visit.         ________________________________________________________________________    A/P  Celestine Godfrey, is a  64 y.o.,  male, recent diagnoses of thoracic aneurysm;  history bipolar disorder and anxiety, presented with generalized anxiety.  Case complicated as there persistent  pressured dialogue, high energy , difficult to redirect, otherwise appropriate insight and judgment likely due to Celestine Godfrey compliance to Lamotrigine and Clonazepam. Devoid of adverse reactions. Devoid of SI , plan or intent. Hence patient was not hospitalized             Treatement: Risks, benefits, and possible side effects of medications explained to patient and patient verbalizes understanding.                      Next [facility] Appointment:  3 months                      ______________________________________________________________________________________________                    Patient Encounter: 6:30 PM - 6:42 PM                    Encounter Duration: Time Spent  12 minutes with Patient.Greater than 50% of total time was spent with the patient

## 2025-06-09 ENCOUNTER — OFFICE VISIT (OUTPATIENT)
Dept: PSYCHIATRY | Facility: CLINIC | Age: 65
End: 2025-06-09
Payer: COMMERCIAL

## 2025-06-09 DIAGNOSIS — F41.1 GENERALIZED ANXIETY DISORDER: ICD-10-CM

## 2025-06-09 DIAGNOSIS — F31.9 BIPOLAR AFFECTIVE DISORDER, REMISSION STATUS UNSPECIFIED (HCC): ICD-10-CM

## 2025-06-09 PROCEDURE — 99214 OFFICE O/P EST MOD 30 MIN: CPT

## 2025-06-09 RX ORDER — LAMOTRIGINE 150 MG/1
TABLET ORAL
Qty: 90 TABLET | Refills: 0 | Status: SHIPPED | OUTPATIENT
Start: 2025-06-09

## 2025-06-09 RX ORDER — CLONAZEPAM 0.5 MG/1
TABLET ORAL
Qty: 90 TABLET | Refills: 2 | Status: SHIPPED | OUTPATIENT
Start: 2025-06-09 | End: 2025-06-30